# Patient Record
Sex: FEMALE | Race: WHITE | NOT HISPANIC OR LATINO | Employment: UNEMPLOYED | URBAN - METROPOLITAN AREA
[De-identification: names, ages, dates, MRNs, and addresses within clinical notes are randomized per-mention and may not be internally consistent; named-entity substitution may affect disease eponyms.]

---

## 2017-01-19 ENCOUNTER — TELEPHONE (OUTPATIENT)
Dept: PAIN MEDICINE | Facility: CLINIC | Age: 42
End: 2017-01-19

## 2017-01-19 NOTE — TELEPHONE ENCOUNTER
----- Message from Megan Costa sent at 1/19/2017 12:12 PM CST -----  Contact: [phone] mr tricia clarke would like to know if patient can be seen asap for anxiety

## 2017-01-20 ENCOUNTER — TELEPHONE (OUTPATIENT)
Dept: INTERNAL MEDICINE | Facility: CLINIC | Age: 42
End: 2017-01-20

## 2017-01-20 NOTE — TELEPHONE ENCOUNTER
----- Message from Megan Costa sent at 1/19/2017  4:24 PM CST -----  Contact: mr clarke [phone]   tricia would like to speak with you to schedule patient for an appt. For anxiety

## 2017-01-31 ENCOUNTER — OFFICE VISIT (OUTPATIENT)
Dept: INTERNAL MEDICINE | Facility: CLINIC | Age: 42
End: 2017-01-31
Payer: COMMERCIAL

## 2017-01-31 VITALS
BODY MASS INDEX: 27.72 KG/M2 | WEIGHT: 146.81 LBS | DIASTOLIC BLOOD PRESSURE: 74 MMHG | SYSTOLIC BLOOD PRESSURE: 112 MMHG | HEIGHT: 61 IN | HEART RATE: 84 BPM

## 2017-01-31 DIAGNOSIS — Z00.00 PREVENTATIVE HEALTH CARE: ICD-10-CM

## 2017-01-31 DIAGNOSIS — F32.A DEPRESSION, UNSPECIFIED DEPRESSION TYPE: ICD-10-CM

## 2017-01-31 DIAGNOSIS — R53.83 FATIGUE, UNSPECIFIED TYPE: Primary | ICD-10-CM

## 2017-01-31 DIAGNOSIS — F41.9 ANXIETY: ICD-10-CM

## 2017-01-31 DIAGNOSIS — Z23 NEEDS FLU SHOT: ICD-10-CM

## 2017-01-31 DIAGNOSIS — E66.3 OVERWEIGHT (BMI 25.0-29.9): ICD-10-CM

## 2017-01-31 PROCEDURE — 90688 IIV4 VACCINE SPLT 0.5 ML IM: CPT | Mod: S$GLB,,, | Performed by: INTERNAL MEDICINE

## 2017-01-31 PROCEDURE — 1159F MED LIST DOCD IN RCRD: CPT | Mod: S$GLB,,, | Performed by: INTERNAL MEDICINE

## 2017-01-31 PROCEDURE — 90471 IMMUNIZATION ADMIN: CPT | Mod: S$GLB,,, | Performed by: INTERNAL MEDICINE

## 2017-01-31 PROCEDURE — 99214 OFFICE O/P EST MOD 30 MIN: CPT | Mod: 25,S$GLB,, | Performed by: INTERNAL MEDICINE

## 2017-01-31 PROCEDURE — 99999 PR PBB SHADOW E&M-EST. PATIENT-LVL III: CPT | Mod: PBBFAC,,, | Performed by: INTERNAL MEDICINE

## 2017-01-31 RX ORDER — VENLAFAXINE HYDROCHLORIDE 37.5 MG/1
37.5 CAPSULE, EXTENDED RELEASE ORAL DAILY
Qty: 30 CAPSULE | Refills: 1 | Status: SHIPPED | OUTPATIENT
Start: 2017-01-31 | End: 2017-03-27 | Stop reason: SDUPTHER

## 2017-01-31 NOTE — PROGRESS NOTES
Subjective:       Patient ID: Maureen Gordillo is a 41 y.o. female.    Chief Complaint: Anxiety    HPI Pt. Here for f/u for anxiety/depression and intermitent fatigue; she states lexapro has not been helping and she would like alternative; she denies suicidal ideation; she will come back fasting for labs; she would like flu shot; she also reports intermitent fatigue and states she is always running around with 4 children; pt.  is with the pt. ; she has lost a few pounds  Review of Systems   Constitutional: Positive for fatigue. Negative for chills and fever.   HENT: Negative for congestion, rhinorrhea and sore throat.    Respiratory: Negative for cough, shortness of breath and wheezing.    Cardiovascular: Negative for chest pain.   Gastrointestinal: Negative for abdominal pain, nausea and vomiting.   Genitourinary: Negative for dysuria.   Musculoskeletal: Negative for arthralgias.   Skin: Negative for rash.   Neurological: Negative for dizziness and headaches.   Psychiatric/Behavioral: Negative for sleep disturbance. The patient is not nervous/anxious.         Anxiety and depression without suicidal ideation; lexapro is not helping       Objective:      Physical Exam   Constitutional: She is oriented to person, place, and time.   Overweight    Eyes: EOM are normal.   Neck: Normal range of motion.   Cardiovascular: Normal rate, regular rhythm and normal heart sounds.    Pulmonary/Chest: Effort normal and breath sounds normal.   Abdominal: Soft. There is no tenderness. There is no rebound and no guarding.   Musculoskeletal: Normal range of motion.   Neurological: She is alert and oriented to person, place, and time.   Skin: No rash noted.       Assessment:       1. Fatigue, unspecified type Active   2. Anxiety Sub-optimally controlled   3. Depression, unspecified depression type Sub-optimally controlled   4. Preventative health care Active   5. Needs flu shot Active       Plan:         Fatigue, unspecified  type  Comments:  get labs including TSH; monitor with better control of depression; pt. has 4 children and she states life can be hectic with family as well  Orders:  -     CBC auto differential; Future; Expected date: 1/31/17  -     TSH; Future; Expected date: 1/31/17    Anxiety  Comments:  D/C lexapro and start effexor XR 37.5 mg QD   Orders:  -     venlafaxine (EFFEXOR-XR) 37.5 MG 24 hr capsule; Take 1 capsule (37.5 mg total) by mouth once daily.  Dispense: 30 capsule; Refill: 1    Depression, unspecified depression type  Comments:  D/C lexapro and start effexor; re-evaluate in 1 month  Orders:  -     venlafaxine (EFFEXOR-XR) 37.5 MG 24 hr capsule; Take 1 capsule (37.5 mg total) by mouth once daily.  Dispense: 30 capsule; Refill: 1    Preventative health care  -     CBC auto differential; Future; Expected date: 1/31/17  -     Comprehensive metabolic panel; Future; Expected date: 1/31/17  -     Lipid panel; Future; Expected date: 1/31/17  -     TSH; Future; Expected date: 1/31/17    Needs flu shot  -     Influenza - Quadrivalent (3 years & older)

## 2017-01-31 NOTE — MR AVS SNAPSHOT
[facility]   [place]  [place] [address]  Phone: [phone]  Fax: [phone]                  Maureen Gordillo   2017 1:40 PM   Office Visit    Description:  Female : 1975   Provider:  Elpidio Vasques MD   Department:  [facility]           Reason for Visit     Anxiety           Diagnoses this Visit        Comments    Fatigue, unspecified type    -  Primary get labs including TSH; monitor with better control of depression; pt. has 4 children and she states life can be hectic with family as well    Anxiety     D/C lexapro and start effexor XR 37.5 mg QD     Depression, unspecified depression type     D/C lexapro and start effexor; re-evaluate in 1 month    Preventative health care         Needs flu shot                To Do List           Future Appointments        Provider Department Dept Phone    2017 7:00 AM SIMA, [facility] [phone]    3/7/2017 8:20 AM Elpidio Vasques MD [facility] [phone]      Goals (5 Years of Data)     None      Follow-Up and Disposition     Return in about 1 month (around 2017).       These Medications        Disp Refills Start End    venlafaxine (EFFEXOR-XR) 37.5 MG 24 hr capsule 30 capsule 1 2017    Take 1 capsule (37.5 mg total) by mouth once daily. - Oral    Pharmacy: [facility]/pharmacy #[id number] - [place] - [id number] [facility] Ph #: [phone]         [facility]     [facility] -  Assistance  Registered nurses in the [facility] provide clinical advisement, health education, appointment booking, and other advisory services.  Call for this free service at [phone].             Medications           Message regarding Medications     Verify the changes and/or additions to your medication regime listed below are the same as discussed with your clinician today.  If any of these changes or additions are  "incorrect, please notify your healthcare provider.        START taking these NEW medications        Refills    venlafaxine (EFFEXOR-XR) 37.5 MG 24 hr capsule 1    Sig: Take 1 capsule (37.5 mg total) by mouth once daily.    Class: Normal    Route: Oral      STOP taking these medications     doxycycline (ORACEA) 40 mg capsule Take 1 capsule (40 mg total) by mouth once daily.    escitalopram oxalate (LEXAPRO) 20 MG tablet TAKE 1 TABLET (20 MG TOTAL) BY MOUTH ONCE DAILY.           Verify that the below list of medications is an accurate representation of the medications you are currently taking.  If none reported, the list may be blank. If incorrect, please contact your healthcare provider. Carry this list with you in case of emergency.           Current Medications     ivermectin 1 % Crea Apply topically once daily.    venlafaxine (EFFEXOR-XR) 37.5 MG 24 hr capsule Take 1 capsule (37.5 mg total) by mouth once daily.           Clinical Reference Information           Vital Signs - Last Recorded  Most recent update: 1/31/2017  2:00 PM by Milana Yanez MA    BP Pulse Ht Wt BMI    112/74 (BP Location: Right arm, Patient Position: Sitting, BP Method: Manual) 84 5' 1" (1.549 m) 66.6 kg (146 lb 13.2 oz) 27.74 kg/m2      Blood Pressure          Most Recent Value    BP  112/74      Allergies as of 1/31/2017     No Known Allergies      Immunizations Administered on Date of Encounter - 1/31/2017     Name Date Dose VIS Date Route    influenza - Quadrivalent  Incomplete 0.5 mL 8/7/2015 Intramuscular      Orders Placed During Today's Visit      Normal Orders This Visit    Influenza - Quadrivalent (3 years & older)     Future Labs/Procedures Expected by Expires    CBC auto differential  1/31/2017 9/30/2017    Comprehensive metabolic panel  1/31/2017 9/30/2017    Lipid panel  1/31/2017 9/30/2017    TSH  1/31/2017 9/30/2017      "

## 2017-02-02 ENCOUNTER — LAB VISIT (OUTPATIENT)
Dept: LAB | Facility: HOSPITAL | Age: 42
End: 2017-02-02
Attending: INTERNAL MEDICINE
Payer: COMMERCIAL

## 2017-02-02 DIAGNOSIS — R53.83 FATIGUE, UNSPECIFIED TYPE: ICD-10-CM

## 2017-02-02 DIAGNOSIS — Z00.00 PREVENTATIVE HEALTH CARE: ICD-10-CM

## 2017-02-02 LAB
ALBUMIN SERPL BCP-MCNC: 4 G/DL
ALP SERPL-CCNC: 68 U/L
ALT SERPL W/O P-5'-P-CCNC: 11 U/L
ANION GAP SERPL CALC-SCNC: 7 MMOL/L
AST SERPL-CCNC: 16 U/L
BASOPHILS # BLD AUTO: 0 K/UL
BASOPHILS NFR BLD: 0 %
BILIRUB SERPL-MCNC: 0.7 MG/DL
BUN SERPL-MCNC: 15 MG/DL
CALCIUM SERPL-MCNC: 9.1 MG/DL
CHLORIDE SERPL-SCNC: 104 MMOL/L
CHOLEST/HDLC SERPL: 3.1 {RATIO}
CO2 SERPL-SCNC: 26 MMOL/L
CREAT SERPL-MCNC: 0.8 MG/DL
DIFFERENTIAL METHOD: ABNORMAL
EOSINOPHIL # BLD AUTO: 0 K/UL
EOSINOPHIL NFR BLD: 1.5 %
ERYTHROCYTE [DISTWIDTH] IN BLOOD BY AUTOMATED COUNT: 12 %
EST. GFR  (AFRICAN AMERICAN): >60 ML/MIN/1.73 M^2
EST. GFR  (NON AFRICAN AMERICAN): >60 ML/MIN/1.73 M^2
GLUCOSE SERPL-MCNC: 80 MG/DL
HCT VFR BLD AUTO: 39.5 %
HDL/CHOLESTEROL RATIO: 32.4 %
HDLC SERPL-MCNC: 207 MG/DL
HDLC SERPL-MCNC: 67 MG/DL
HGB BLD-MCNC: 13.7 G/DL
LDLC SERPL CALC-MCNC: 113.6 MG/DL
LYMPHOCYTES # BLD AUTO: 0.9 K/UL
LYMPHOCYTES NFR BLD: 32.1 %
MCH RBC QN AUTO: 31.4 PG
MCHC RBC AUTO-ENTMCNC: 34.7 %
MCV RBC AUTO: 91 FL
MONOCYTES # BLD AUTO: 0.4 K/UL
MONOCYTES NFR BLD: 15.1 %
NEUTROPHILS # BLD AUTO: 1.4 K/UL
NEUTROPHILS NFR BLD: 50.9 %
NONHDLC SERPL-MCNC: 140 MG/DL
PLATELET # BLD AUTO: 142 K/UL
PMV BLD AUTO: 11.6 FL
POTASSIUM SERPL-SCNC: 4.2 MMOL/L
PROT SERPL-MCNC: 7.4 G/DL
RBC # BLD AUTO: 4.36 M/UL
SODIUM SERPL-SCNC: 137 MMOL/L
TRIGL SERPL-MCNC: 132 MG/DL
TSH SERPL DL<=0.005 MIU/L-ACNC: 2.65 UIU/ML
WBC # BLD AUTO: 2.71 K/UL

## 2017-02-02 PROCEDURE — 84443 ASSAY THYROID STIM HORMONE: CPT

## 2017-02-02 PROCEDURE — 80053 COMPREHEN METABOLIC PANEL: CPT

## 2017-02-02 PROCEDURE — 36415 COLL VENOUS BLD VENIPUNCTURE: CPT | Mod: PO

## 2017-02-02 PROCEDURE — 80061 LIPID PANEL: CPT

## 2017-02-02 PROCEDURE — 85025 COMPLETE CBC W/AUTO DIFF WBC: CPT

## 2017-03-27 ENCOUNTER — LAB VISIT (OUTPATIENT)
Dept: LAB | Facility: HOSPITAL | Age: 42
End: 2017-03-27
Attending: INTERNAL MEDICINE
Payer: COMMERCIAL

## 2017-03-27 ENCOUNTER — OFFICE VISIT (OUTPATIENT)
Dept: INTERNAL MEDICINE | Facility: CLINIC | Age: 42
End: 2017-03-27
Payer: COMMERCIAL

## 2017-03-27 VITALS
WEIGHT: 149.69 LBS | HEIGHT: 61 IN | HEART RATE: 81 BPM | SYSTOLIC BLOOD PRESSURE: 122 MMHG | BODY MASS INDEX: 28.26 KG/M2 | DIASTOLIC BLOOD PRESSURE: 82 MMHG

## 2017-03-27 DIAGNOSIS — F41.9 ANXIETY: ICD-10-CM

## 2017-03-27 DIAGNOSIS — D72.819 LEUKOPENIA, UNSPECIFIED TYPE: ICD-10-CM

## 2017-03-27 DIAGNOSIS — F32.A DEPRESSION, UNSPECIFIED DEPRESSION TYPE: ICD-10-CM

## 2017-03-27 DIAGNOSIS — E78.00 HYPERCHOLESTEREMIA: Primary | ICD-10-CM

## 2017-03-27 DIAGNOSIS — E66.3 OVERWEIGHT (BMI 25.0-29.9): ICD-10-CM

## 2017-03-27 DIAGNOSIS — D69.6 THROMBOCYTOPENIA: ICD-10-CM

## 2017-03-27 LAB
BASOPHILS # BLD AUTO: 0.01 K/UL
BASOPHILS NFR BLD: 0.3 %
DIFFERENTIAL METHOD: ABNORMAL
EOSINOPHIL # BLD AUTO: 0 K/UL
EOSINOPHIL NFR BLD: 1.1 %
ERYTHROCYTE [DISTWIDTH] IN BLOOD BY AUTOMATED COUNT: 12.5 %
HCT VFR BLD AUTO: 39.1 %
HGB BLD-MCNC: 13.7 G/DL
LYMPHOCYTES # BLD AUTO: 1 K/UL
LYMPHOCYTES NFR BLD: 28.9 %
MCH RBC QN AUTO: 31.4 PG
MCHC RBC AUTO-ENTMCNC: 35 %
MCV RBC AUTO: 90 FL
MONOCYTES # BLD AUTO: 0.3 K/UL
MONOCYTES NFR BLD: 9.2 %
NEUTROPHILS # BLD AUTO: 2.1 K/UL
NEUTROPHILS NFR BLD: 60.5 %
PLATELET # BLD AUTO: 166 K/UL
PMV BLD AUTO: 11.5 FL
RBC # BLD AUTO: 4.37 M/UL
WBC # BLD AUTO: 3.49 K/UL

## 2017-03-27 PROCEDURE — 99214 OFFICE O/P EST MOD 30 MIN: CPT | Mod: S$GLB,,, | Performed by: INTERNAL MEDICINE

## 2017-03-27 PROCEDURE — 86703 HIV-1/HIV-2 1 RESULT ANTBDY: CPT

## 2017-03-27 PROCEDURE — 1160F RVW MEDS BY RX/DR IN RCRD: CPT | Mod: S$GLB,,, | Performed by: INTERNAL MEDICINE

## 2017-03-27 PROCEDURE — 99999 PR PBB SHADOW E&M-EST. PATIENT-LVL III: CPT | Mod: PBBFAC,,, | Performed by: INTERNAL MEDICINE

## 2017-03-27 PROCEDURE — 36415 COLL VENOUS BLD VENIPUNCTURE: CPT | Mod: PO

## 2017-03-27 PROCEDURE — 85025 COMPLETE CBC W/AUTO DIFF WBC: CPT

## 2017-03-27 RX ORDER — VENLAFAXINE HYDROCHLORIDE 75 MG/1
75 CAPSULE, EXTENDED RELEASE ORAL DAILY
Qty: 30 CAPSULE | Refills: 1 | Status: SHIPPED | OUTPATIENT
Start: 2017-03-27 | End: 2017-03-29 | Stop reason: SDUPTHER

## 2017-03-27 NOTE — PROGRESS NOTES
Subjective:       Patient ID: Maureen Gordillo is a 41 y.o. female.    Chief Complaint: Anxiety (Increase in Effexor)    HPI Pt. Here for f/u for anxiety/depression; she states effexor helps but she would like to increase dose;  I reviewed labs dated 2/2/17; cholesterol was mildly elevated; platelets were mildly low; white count was low but stable; she denies risk factors for HIV but is agreeable to screening; she has gained weight  Review of Systems   Constitutional: Negative for chills, fatigue and fever.   HENT: Negative for congestion, rhinorrhea and sore throat.    Respiratory: Negative for cough, shortness of breath and wheezing.    Cardiovascular: Negative for chest pain.   Gastrointestinal: Negative for abdominal pain, nausea and vomiting.   Genitourinary: Negative for dysuria.   Musculoskeletal: Negative for arthralgias.   Skin: Negative for rash.   Neurological: Negative for dizziness and headaches.   Psychiatric/Behavioral: Negative for sleep disturbance. The patient is nervous/anxious.         Anxiety and depression improving but not completely controlled       Objective:      Physical Exam   Constitutional: She is oriented to person, place, and time.   overweight   Eyes: EOM are normal.   Neck: Normal range of motion.   Cardiovascular: Normal rate, regular rhythm and normal heart sounds.    Pulmonary/Chest: Effort normal and breath sounds normal.   Abdominal: Soft. There is no tenderness. There is no rebound and no guarding.   Musculoskeletal: Normal range of motion.   Neurological: She is alert and oriented to person, place, and time.   Skin: No rash noted.       Assessment:       1. Hypercholesteremia Sub-optimally controlled   2. Thrombocytopenia Active   3. Leukopenia, unspecified type Stable   4. Anxiety Sub-optimally controlled   5. Depression, unspecified depression type Sub-optimally controlled   6. Overweight (BMI 25.0-29.9) Sub-optimally controlled       Plan:          Hypercholesteremia  Comments:  encouraged low cholesterol diet     Thrombocytopenia  Comments:  repeat CBC in 1 month for verification  Orders:  -     CBC auto differential; Future; Expected date: 3/27/17    Leukopenia, unspecified type  Comments:  get HIV screening  Orders:  -     HIV-1 and HIV-2 antibodies; Future; Expected date: 3/27/17    Anxiety  Comments:  increase dose of effexor and re-evaluate in 1 month   Orders:  -     venlafaxine (EFFEXOR-XR) 75 MG 24 hr capsule; Take 1 capsule (75 mg total) by mouth once daily.  Dispense: 30 capsule; Refill: 1    Depression, unspecified depression type  Comments:  increase dose of effexor and re-evaluate in 1 month   Orders:  -     venlafaxine (EFFEXOR-XR) 75 MG 24 hr capsule; Take 1 capsule (75 mg total) by mouth once daily.  Dispense: 30 capsule; Refill: 1    Overweight (BMI 25.0-29.9)  Comments:  encouraged diet

## 2017-03-27 NOTE — MR AVS SNAPSHOT
[facility]   [place]  [place] [address]  Phone: [phone]  Fax: [phone]                  Maureen Gordillo   3/27/2017 9:20 AM   Office Visit    Description:  Female : 1975   Provider:  Elpidio Vasques MD   Department:  [facility]           Reason for Visit     Anxiety           Diagnoses this Visit        Comments    Hypercholesteremia    -  Primary     Thrombocytopenia         Leukopenia, unspecified type         Anxiety         Depression, unspecified depression type         Overweight (BMI 25.0-29.9)                To Do List           Future Appointments        Provider Department Dept Phone    3/27/2017 10:15 AM LAB, [facility] [phone]    2017 1:00 PM Elpidio Vasques MD [facility] [phone]      Goals (5 Years of Data)     None       These Medications        Disp Refills Start End    venlafaxine (EFFEXOR-XR) 75 MG 24 hr capsule 30 capsule 1 3/27/2017 3/27/2018    Take 1 capsule (75 mg total) by mouth once daily. - Oral    Pharmacy: [facility]/pharmacy #[id number] - [place] - [id number] [facility] #: [phone]         [facility]     [facility] Care [facility] -  Assistance  Registered nurses in the [facility] provide clinical advisement, health education, appointment booking, and other advisory services.  Call for this free service at [phone].             Medications           Message regarding Medications     Verify the changes and/or additions to your medication regime listed below are the same as discussed with your clinician today.  If any of these changes or additions are incorrect, please notify your healthcare provider.        CHANGE how you are taking these medications     Start Taking Instead of    venlafaxine (EFFEXOR-XR) 75 MG 24 hr capsule venlafaxine (EFFEXOR-XR) 37.5 MG 24 hr capsule    Dosage:  Take 1 capsule (75 mg total) by mouth once daily.  "Dosage:  Take 1 capsule (37.5 mg total) by mouth once daily.    Reason for Change:  Reorder            Verify that the below list of medications is an accurate representation of the medications you are currently taking.  If none reported, the list may be blank. If incorrect, please contact your healthcare provider. Carry this list with you in case of emergency.           Current Medications     ivermectin 1 % Crea Apply topically once daily.    venlafaxine (EFFEXOR-XR) 75 MG 24 hr capsule Take 1 capsule (75 mg total) by mouth once daily.           Clinical Reference Information           Your Vitals Were     BP Pulse Height Weight BMI    122/82 (BP Location: Left arm, Patient Position: Sitting, BP Method: Manual) 81 5' 1" (1.549 m) 67.9 kg (149 lb 11.1 oz) 28.28 kg/m2      Blood Pressure          Most Recent Value    BP  122/82      Allergies as of 3/27/2017     No Known Allergies      Immunizations Administered on Date of Encounter - 3/27/2017     None      Orders Placed During Today's Visit     Future Labs/Procedures Expected by Expires    CBC auto differential  3/27/2017 11/27/2017    HIV-1 and HIV-2 antibodies  3/27/2017 11/27/2017      Language Assistance Services     ATTENTION: Language assistance services are available, free of charge. Please call [phone].      ATENCIÓN: Si tamie rust, tiene a quintana disposición servicios gratuitos de asistencia lingüística. Llame al [phone].     CHÚ Ý: N?u b?n nói Ti?ng Vi?t, có các d?ch v? h? tr? ngôn ng? mi?n phí dành cho b?n. G?i s? [phone].         [facility] complies with applicable Federal civil rights laws and does not discriminate on the basis of race, color, national origin, age, disability, or sex.        "

## 2017-03-28 LAB — HIV 1+2 AB+HIV1 P24 AG SERPL QL IA: NEGATIVE

## 2017-03-29 ENCOUNTER — PATIENT MESSAGE (OUTPATIENT)
Dept: INTERNAL MEDICINE | Facility: CLINIC | Age: 42
End: 2017-03-29

## 2017-03-29 DIAGNOSIS — F32.A DEPRESSION, UNSPECIFIED DEPRESSION TYPE: ICD-10-CM

## 2017-03-29 DIAGNOSIS — F41.9 ANXIETY: ICD-10-CM

## 2017-03-29 RX ORDER — VENLAFAXINE HYDROCHLORIDE 75 MG/1
75 CAPSULE, EXTENDED RELEASE ORAL DAILY
Qty: 30 CAPSULE | Refills: 1 | Status: SHIPPED | OUTPATIENT
Start: 2017-03-29 | End: 2017-05-08 | Stop reason: SDUPTHER

## 2017-05-08 ENCOUNTER — OFFICE VISIT (OUTPATIENT)
Dept: INTERNAL MEDICINE | Facility: CLINIC | Age: 42
End: 2017-05-08
Payer: COMMERCIAL

## 2017-05-08 VITALS
BODY MASS INDEX: 29.02 KG/M2 | WEIGHT: 153.69 LBS | DIASTOLIC BLOOD PRESSURE: 76 MMHG | HEIGHT: 61 IN | HEART RATE: 85 BPM | SYSTOLIC BLOOD PRESSURE: 108 MMHG | OXYGEN SATURATION: 98 %

## 2017-05-08 DIAGNOSIS — F32.A DEPRESSION, UNSPECIFIED DEPRESSION TYPE: ICD-10-CM

## 2017-05-08 DIAGNOSIS — Z00.00 PREVENTATIVE HEALTH CARE: ICD-10-CM

## 2017-05-08 DIAGNOSIS — E66.3 OVERWEIGHT (BMI 25.0-29.9): ICD-10-CM

## 2017-05-08 DIAGNOSIS — F41.9 ANXIETY: Primary | ICD-10-CM

## 2017-05-08 DIAGNOSIS — D72.819 LEUKOPENIA, UNSPECIFIED TYPE: ICD-10-CM

## 2017-05-08 PROCEDURE — 99999 PR PBB SHADOW E&M-EST. PATIENT-LVL III: CPT | Mod: PBBFAC,,, | Performed by: INTERNAL MEDICINE

## 2017-05-08 PROCEDURE — 99395 PREV VISIT EST AGE 18-39: CPT | Mod: S$GLB,,, | Performed by: INTERNAL MEDICINE

## 2017-05-08 RX ORDER — VENLAFAXINE HYDROCHLORIDE 75 MG/1
75 CAPSULE, EXTENDED RELEASE ORAL DAILY
Qty: 90 CAPSULE | Refills: 1 | Status: SHIPPED | OUTPATIENT
Start: 2017-05-08 | End: 2017-08-23 | Stop reason: ALTCHOICE

## 2017-05-08 NOTE — MR AVS SNAPSHOT
[facility]   [place]  [place] [address]  Phone: [phone]  Fax: [phone]                  Maureen Gordillo   2017 1:00 PM   Office Visit    Description:  Female : 1975   Provider:  Elpidio Vasques MD   Department:  [facility]           Reason for Visit     Follow-up     Anxiety           Diagnoses this Visit        Comments    Anxiety    -  Primary continue effexor    Depression, unspecified depression type     continue effexor    Preventative health care         Leukopenia, unspecified type     improving; HIV negative; monitor    Overweight (BMI 25.0-29.9)     encouraged diet            To Do List           Goals (5 Years of Data)     None      Follow-Up and Disposition     Return in about 1 year (around 2018).    Follow-up and Disposition History       These Medications        Disp Refills Start End    venlafaxine (EFFEXOR-XR) 75 MG 24 hr capsule 90 capsule 1 2017     Take 1 capsule (75 mg total) by mouth once daily. - Oral    Pharmacy: [facility]/pharmacy #[id number] - [place] - [id number] [facility] Ph #: [phone]         [facility]     [facility] Care [facility] -  Assistance  Unless otherwise directed by your provider, please contact [facility], our nurse care line that is available for  assistance.     Registered nurses in the [facility] provide: appointment scheduling, clinical advisement, health education, and other advisory services.  Call: [phone] (toll free)               Medications           Message regarding Medications     Verify the changes and/or additions to your medication regime listed below are the same as discussed with your clinician today.  If any of these changes or additions are incorrect, please notify your healthcare provider.             Verify that the below list of medications is an accurate representation of the medications you are currently taking.  If none reported,  "the list may be blank. If incorrect, please contact your healthcare provider. Carry this list with you in case of emergency.           Current Medications     ivermectin 1 % Crea Apply topically once daily.    venlafaxine (EFFEXOR-XR) 75 MG 24 hr capsule Take 1 capsule (75 mg total) by mouth once daily.           Clinical Reference Information           Your Vitals Were     BP Pulse Height Weight Last Period SpO2    108/76 (BP Location: Right arm, Patient Position: Sitting, BP Method: Manual) 85 5' 1" (1.549 m) 69.7 kg (153 lb 10.6 oz) 10/17/2015 98%    BMI                29.03 kg/m2          Blood Pressure          Most Recent Value    BP  108/76      Allergies as of 5/8/2017     No Known Allergies      Immunizations Administered on Date of Encounter - 5/8/2017     None      Orders Placed During Today's Visit     Future Labs/Procedures Expected by Expires    CBC auto differential  1/8/2018 5/8/2018    Comprehensive metabolic panel  1/8/2018 5/8/2018    Lipid panel  1/8/2018 5/8/2018    TSH  1/8/2018 5/8/2018      Language Assistance Services     ATTENTION: Language assistance services are available, free of charge. Please call [phone].      ATENCIÓN: Si tamie rsut, tiene a quintana disposición servicios gratuitos de asistencia lingüística. Llame al [phone].     CHÚ Ý: N?u b?n nói Ti?ng Vi?t, có các d?ch v? h? tr? ngôn ng? mi?n phí dành cho b?n. G?i s? [phone].         [facility] complies with applicable Federal civil rights laws and does not discriminate on the basis of race, color, national origin, age, disability, or sex.        "

## 2017-05-08 NOTE — PROGRESS NOTES
Subjective:       Patient ID: Maureen Gordillo is a 41 y.o. female.    Chief Complaint: Follow-up (1 mo) and Anxiety (review med)    HPI Pt. Here for f/u for anxiety and depression; she states higher dose of effexor helps; I reviewed labs dated 3/27/17; white count was improving; HIV test was negative; she has gained a few pounds  Review of Systems   Constitutional: Negative for chills, fatigue and fever.   HENT: Negative for congestion, rhinorrhea and sore throat.    Respiratory: Negative for cough, shortness of breath and wheezing.    Cardiovascular: Negative for chest pain.   Gastrointestinal: Negative for abdominal pain, nausea and vomiting.   Genitourinary: Negative for dysuria.   Musculoskeletal: Negative for arthralgias.   Skin: Negative for rash.   Neurological: Negative for dizziness and headaches.   Psychiatric/Behavioral: Negative for sleep disturbance. The patient is nervous/anxious.         Anxiety and depression well controlled with higher dose of effexor       Objective:      Physical Exam   Constitutional: She is oriented to person, place, and time. She appears well-developed.   Eyes: EOM are normal.   Neck: Normal range of motion.   Cardiovascular: Normal rate, regular rhythm and normal heart sounds.    Pulmonary/Chest: Effort normal and breath sounds normal.   Abdominal: Soft. There is no tenderness. There is no rebound and no guarding.   Musculoskeletal: Normal range of motion.   Neurological: She is alert and oriented to person, place, and time.   Skin: No rash noted.       Assessment:       1. Anxiety Well controlled   2. Depression, unspecified depression type Well controlled   3. Preventative health care Active   4. Leukopenia, unspecified type Active   5. Overweight (BMI 25.0-29.9) Sub-optimally controlled       Plan:         Anxiety  Comments:  continue effexor  Orders:  -     venlafaxine (EFFEXOR-XR) 75 MG 24 hr capsule; Take 1 capsule (75 mg total) by mouth once daily.  Dispense: 90  capsule; Refill: 1    Depression, unspecified depression type  Comments:  continue effexor  Orders:  -     venlafaxine (EFFEXOR-XR) 75 MG 24 hr capsule; Take 1 capsule (75 mg total) by mouth once daily.  Dispense: 90 capsule; Refill: 1    [facility] care  -     Comprehensive metabolic panel; Future; Expected date: 1/8/18  -     Lipid panel; Future; Expected date: 1/8/18  -     TSH; Future; Expected date: 1/8/18  -     CBC auto differential; Future; Expected date: 1/8/18    Leukopenia, unspecified type  Comments:  improving; HIV negative; monitor    Overweight (BMI 25.0-29.9)  Comments:  encouraged diet

## 2017-08-07 PROBLEM — Z00.00 PREVENTATIVE HEALTH CARE: Status: RESOLVED | Noted: 2017-05-08 | Resolved: 2017-08-07

## 2017-08-18 ENCOUNTER — TELEPHONE (OUTPATIENT)
Dept: INTERNAL MEDICINE | Facility: CLINIC | Age: 42
End: 2017-08-18

## 2017-08-18 ENCOUNTER — PATIENT MESSAGE (OUTPATIENT)
Dept: INTERNAL MEDICINE | Facility: CLINIC | Age: 42
End: 2017-08-18

## 2017-08-18 NOTE — TELEPHONE ENCOUNTER
Notify pt. I reviewed her email; I can try wellbutrin; see if she has ever had any hx of seizures and let me know so I can try this med

## 2017-08-22 ENCOUNTER — TELEPHONE (OUTPATIENT)
Dept: INTERNAL MEDICINE | Facility: CLINIC | Age: 42
End: 2017-08-22

## 2017-08-22 DIAGNOSIS — F41.9 ANXIETY: ICD-10-CM

## 2017-08-22 DIAGNOSIS — F32.A DEPRESSION, UNSPECIFIED DEPRESSION TYPE: Primary | ICD-10-CM

## 2017-08-22 NOTE — TELEPHONE ENCOUNTER
Spoke to pt and adv Dr. Vasques wants to try med Wellbutrin. Also asked if she has any hx of seizures, pt adv no history of seizures. Pt adv will be called back once script is sent to pharmacy. Pt verbalizes understanding.

## 2017-08-23 RX ORDER — BUPROPION HYDROCHLORIDE 150 MG/1
150 TABLET, EXTENDED RELEASE ORAL DAILY
Qty: 30 TABLET | Refills: 1 | Status: SHIPPED | OUTPATIENT
Start: 2017-08-23 | End: 2017-10-16 | Stop reason: CLARIF

## 2017-10-16 ENCOUNTER — OFFICE VISIT (OUTPATIENT)
Dept: INTERNAL MEDICINE | Facility: CLINIC | Age: 42
End: 2017-10-16
Payer: COMMERCIAL

## 2017-10-16 VITALS
DIASTOLIC BLOOD PRESSURE: 80 MMHG | RESPIRATION RATE: 16 BRPM | TEMPERATURE: 98 F | WEIGHT: 155.44 LBS | BODY MASS INDEX: 29.35 KG/M2 | SYSTOLIC BLOOD PRESSURE: 112 MMHG | HEART RATE: 80 BPM | HEIGHT: 61 IN | OXYGEN SATURATION: 98 %

## 2017-10-16 DIAGNOSIS — F40.10 SOCIAL PHOBIA, UNSPECIFIED: ICD-10-CM

## 2017-10-16 DIAGNOSIS — F32.A DEPRESSION, UNSPECIFIED DEPRESSION TYPE: Primary | ICD-10-CM

## 2017-10-16 PROCEDURE — 99214 OFFICE O/P EST MOD 30 MIN: CPT | Mod: S$GLB,,, | Performed by: INTERNAL MEDICINE

## 2017-10-16 RX ORDER — SERTRALINE HYDROCHLORIDE 100 MG/1
100 TABLET, FILM COATED ORAL DAILY
Qty: 30 TABLET | Refills: 11 | Status: SHIPPED | OUTPATIENT
Start: 2017-10-16 | End: 2018-12-31 | Stop reason: ALTCHOICE

## 2017-10-16 NOTE — PROGRESS NOTES
"Subjective:      Patient ID: Maureen Gordillo is a 42 y.o. female.    Chief Complaint: Establish Care; Anxiety; and Depression    HPI: 42 y.o. White female , pt of Dr. Elpidio Vasques.  She did not go on the Wellbutrin because she read the side effects and got frightened.  She is under extreme stress with a step child that is autistic , diabetic, special needs,  And 3 other children of hers. Her  is [place] days [place], several weeks home.  Her parents are available for some help, but the grandparents and mother of autistic  Child are not.  She panics with social issues, has a phobia of public places and dealing with people.  Very depressed. Not suicidal.  Not homicidal.  Does not feel comfortable " in her own skin".       Review of Systems   Constitutional: Positive for activity change. Negative for unexpected weight change.   HENT: Negative for hearing loss, rhinorrhea and trouble swallowing.    Eyes: Negative for discharge and visual disturbance.   Respiratory: Negative for chest tightness and wheezing.    Cardiovascular: Negative for chest pain and palpitations.   Gastrointestinal: Negative for blood in stool, constipation, diarrhea and vomiting.   Endocrine: Negative for polydipsia and polyuria.   Genitourinary: Negative for difficulty urinating, dysuria, hematuria and menstrual problem.   Musculoskeletal: Negative for arthralgias, joint swelling and neck pain.   Neurological: Positive for headaches. Negative for weakness.   Psychiatric/Behavioral: Positive for dysphoric mood. Negative for confusion.       Objective:   /80 (BP Location: Left arm, Patient Position: Sitting, BP Method: Large (Manual))   Pulse 80   Temp 98.2 °F (36.8 °C) (Oral)   Resp 16   Ht 5' 1" (1.549 m)   Wt 70.5 kg (155 lb 6.8 oz)   LMP 10/17/2015   SpO2 98%   BMI 29.37 kg/m²     Physical Exam   Constitutional: She is oriented to person, place, and time. She appears well-developed and well-nourished.   HENT:   Head: Normocephalic " and atraumatic.   Right Ear: External ear normal.   Left Ear: External ear normal.   Nose: Nose normal.   Mouth/Throat: Oropharynx is clear and moist.   Eyes: Conjunctivae and EOM are normal. Pupils are equal, round, and reactive to light.   Neck: Normal range of motion. Neck supple.   Cardiovascular: Normal rate, regular rhythm and normal heart sounds.    Pulmonary/Chest: Effort normal and breath sounds normal.   Abdominal: Soft. Bowel sounds are normal.   Musculoskeletal: Normal range of motion.   Neurological: She is alert and oriented to person, place, and time.   Skin: Skin is warm and dry.   Psychiatric: She has a normal mood and affect. Her behavior is normal.   Nursing note and vitals reviewed.      Assessment:     1. Depression, unspecified depression type    2. Social phobia, unspecified    Needs counseling also, with a psychotherapist.  Plan:     Depression, unspecified depression type  -     sertraline (ZOLOFT) 100 MG tablet; Take 1 tablet (100 mg total) by mouth once daily.  Dispense: 30 tablet; Refill: 11    Social phobia, unspecified  -     sertraline (ZOLOFT) 100 MG tablet; Take 1 tablet (100 mg total) by mouth once daily.  Dispense: 30 tablet; Refill: 11    Told to begin at 50mg/day for 1 week, then increase to 100mg/day.

## 2018-06-22 DIAGNOSIS — Z12.39 BREAST CANCER SCREENING: ICD-10-CM

## 2018-12-31 ENCOUNTER — OFFICE VISIT (OUTPATIENT)
Dept: INTERNAL MEDICINE | Facility: CLINIC | Age: 43
End: 2018-12-31
Payer: COMMERCIAL

## 2018-12-31 VITALS
OXYGEN SATURATION: 99 % | DIASTOLIC BLOOD PRESSURE: 70 MMHG | WEIGHT: 160.25 LBS | SYSTOLIC BLOOD PRESSURE: 122 MMHG | HEIGHT: 61 IN | BODY MASS INDEX: 30.26 KG/M2 | HEART RATE: 77 BPM

## 2018-12-31 DIAGNOSIS — Z90.710 HX OF HYSTERECTOMY: ICD-10-CM

## 2018-12-31 DIAGNOSIS — F32.A DEPRESSION, UNSPECIFIED DEPRESSION TYPE: ICD-10-CM

## 2018-12-31 DIAGNOSIS — Z23 NEED FOR TDAP VACCINATION: ICD-10-CM

## 2018-12-31 DIAGNOSIS — Z12.39 BREAST CANCER SCREENING: ICD-10-CM

## 2018-12-31 DIAGNOSIS — E66.9 CLASS 1 OBESITY WITHOUT SERIOUS COMORBIDITY WITH BODY MASS INDEX (BMI) OF 30.0 TO 30.9 IN ADULT, UNSPECIFIED OBESITY TYPE: ICD-10-CM

## 2018-12-31 DIAGNOSIS — Z00.00 PREVENTATIVE HEALTH CARE: Primary | ICD-10-CM

## 2018-12-31 DIAGNOSIS — Z00.00 ROUTINE GENERAL MEDICAL EXAMINATION AT A HEALTH CARE FACILITY: ICD-10-CM

## 2018-12-31 DIAGNOSIS — F41.9 ANXIETY: ICD-10-CM

## 2018-12-31 PROBLEM — E66.811 CLASS 1 OBESITY WITHOUT SERIOUS COMORBIDITY WITH BODY MASS INDEX (BMI) OF 30.0 TO 30.9 IN ADULT: Status: ACTIVE | Noted: 2018-12-31

## 2018-12-31 PROCEDURE — 90471 IMMUNIZATION ADMIN: CPT | Mod: S$GLB,,, | Performed by: INTERNAL MEDICINE

## 2018-12-31 PROCEDURE — 99396 PREV VISIT EST AGE 40-64: CPT | Mod: 25,S$GLB,, | Performed by: INTERNAL MEDICINE

## 2018-12-31 PROCEDURE — 99999 PR PBB SHADOW E&M-EST. PATIENT-LVL III: CPT | Mod: PBBFAC,,, | Performed by: INTERNAL MEDICINE

## 2018-12-31 PROCEDURE — 90715 TDAP VACCINE 7 YRS/> IM: CPT | Mod: S$GLB,,, | Performed by: INTERNAL MEDICINE

## 2018-12-31 RX ORDER — VENLAFAXINE HYDROCHLORIDE 37.5 MG/1
37.5 CAPSULE, EXTENDED RELEASE ORAL DAILY
Qty: 30 CAPSULE | Refills: 1 | Status: SHIPPED | OUTPATIENT
Start: 2018-12-31 | End: 2019-03-26 | Stop reason: SDUPTHER

## 2018-12-31 NOTE — PROGRESS NOTES
Answers for HPI/ROS submitted by the patient on 12/30/2018   activity change: No  unexpected weight change: No  rhinorrhea: No  trouble swallowing: No  visual disturbance: No  chest tightness: No  polyuria: No  difficulty urinating: No  menstrual problem: No  joint swelling: No  arthralgias: No  confusion: No  dysphoric mood: No  Pt. ID: Maureen Gordillo is a 43 y.o. female      Chief complaint:   Chief Complaint   Patient presents with    Annual Exam       HPI: pt. Here for well visit; she is no longer on zoloft due to resolution of symptoms; she states her depression and anxiety are bothersome again and would like to try something other than zoloft; she denies suicidal ideation; pt. Will come back fasting; she has gained a few pounds; LMP: hysterectomy; she would like tetanus shot; pt. Would like mammo      Review of Systems   HENT: Negative for hearing loss.    Eyes: Negative for discharge.   Respiratory: Negative for wheezing.    Cardiovascular: Negative for chest pain and palpitations.   Gastrointestinal: Negative for blood in stool, constipation, diarrhea and vomiting.   Genitourinary: Negative for dysuria and hematuria.   Musculoskeletal: Negative for neck pain.   Neurological: Negative for weakness and headaches.   Endo/Heme/Allergies: Negative for polydipsia.   Psychiatric/Behavioral: Positive for depression. Negative for suicidal ideas. The patient is nervous/anxious.          Objective:    Physical Exam   Constitutional: She is oriented to person, place, and time.   obese   Eyes: EOM are normal.   Neck: Normal range of motion.   Cardiovascular: Normal rate, regular rhythm and normal heart sounds.   Pulmonary/Chest: Effort normal and breath sounds normal.   Abdominal: Soft. There is no tenderness. There is no rebound and no guarding.   Musculoskeletal: Normal range of motion.   Neurological: She is alert and oriented to person, place, and time.   Skin: No rash noted.   Vitals reviewed.        Health  Maintenance   Topic Date Due    Mammogram  09/14/2017    TETANUS VACCINE  12/31/2028    Influenza Vaccine  Completed    Lipid Panel  Completed         Assessment:     1. Preventative health care Active   2. Depression, unspecified depression type Sub-optimally controlled   3. Anxiety Sub-optimally controlled   4. Routine general medical examination at a health care facility Active   5. Hx of hysterectomy Active   6. Class 1 obesity without serious comorbidity with body mass index (BMI) of 30.0 to 30.9 in adult, unspecified obesity type Sub-optimally controlled   7. Breast cancer screening Active   8. Need for Tdap vaccination Active         Plan: Preventative health care  -     CBC auto differential; Future; Expected date: 12/31/2018  -     Comprehensive metabolic panel; Future; Expected date: 12/31/2018  -     Lipid panel; Future; Expected date: 12/31/2018  -     TSH; Future; Expected date: 12/31/2018    Depression, unspecified depression type  Comments:  start effexor and re-evaluate in 1 month   Orders:  -     venlafaxine (EFFEXOR-XR) 37.5 MG 24 hr capsule; Take 1 capsule (37.5 mg total) by mouth once daily.  Dispense: 30 capsule; Refill: 1    Anxiety  Comments:  start effexor and re-evaluate in 1 month   Orders:  -     venlafaxine (EFFEXOR-XR) 37.5 MG 24 hr capsule; Take 1 capsule (37.5 mg total) by mouth once daily.  Dispense: 30 capsule; Refill: 1    Routine general medical examination at a health care facility    Hx of hysterectomy    Class 1 obesity without serious comorbidity with body mass index (BMI) of 30.0 to 30.9 in adult, unspecified obesity type  Comments:  encouraged diet and explained risks     Breast cancer screening  -     Mammo Digital Screening Bilateral With CAD; Future; Expected date: 12/31/2018    Need for Tdap vaccination  -     (In Office Administered) Tdap Vaccine        Problem List Items Addressed This Visit        Psychiatric    Anxiety    Relevant Medications    venlafaxine  (EFFEXOR-XR) 37.5 MG 24 hr capsule    Depression    Relevant Medications    venlafaxine (EFFEXOR-XR) 37.5 MG 24 hr capsule       Renal/    Breast cancer screening - Primary    Relevant Orders    Mammo Digital Screening Bilateral With CAD    Hx of hysterectomy       ID    Need for Tdap vaccination    Relevant Orders    (In Office Administered) Tdap Vaccine       Endocrine    Class 1 obesity without serious comorbidity with body mass index (BMI) of 30.0 to 30.9 in adult       Other    Routine general medical examination at a health care facility

## 2019-01-25 ENCOUNTER — PATIENT MESSAGE (OUTPATIENT)
Dept: INTERNAL MEDICINE | Facility: CLINIC | Age: 44
End: 2019-01-25

## 2019-01-29 ENCOUNTER — LAB VISIT (OUTPATIENT)
Dept: LAB | Facility: HOSPITAL | Age: 44
End: 2019-01-29
Attending: INTERNAL MEDICINE
Payer: COMMERCIAL

## 2019-01-29 DIAGNOSIS — Z00.00 PREVENTATIVE HEALTH CARE: ICD-10-CM

## 2019-01-29 LAB
ALBUMIN SERPL BCP-MCNC: 4 G/DL
ALP SERPL-CCNC: 74 U/L
ALT SERPL W/O P-5'-P-CCNC: 13 U/L
ANION GAP SERPL CALC-SCNC: 10 MMOL/L
AST SERPL-CCNC: 15 U/L
BASOPHILS # BLD AUTO: 0.01 K/UL
BASOPHILS NFR BLD: 0.4 %
BILIRUB SERPL-MCNC: 0.9 MG/DL
BUN SERPL-MCNC: 12 MG/DL
CALCIUM SERPL-MCNC: 9.6 MG/DL
CHLORIDE SERPL-SCNC: 103 MMOL/L
CHOLEST SERPL-MCNC: 202 MG/DL
CHOLEST/HDLC SERPL: 3.7 {RATIO}
CO2 SERPL-SCNC: 25 MMOL/L
CREAT SERPL-MCNC: 0.9 MG/DL
DIFFERENTIAL METHOD: ABNORMAL
EOSINOPHIL # BLD AUTO: 0 K/UL
EOSINOPHIL NFR BLD: 1.1 %
ERYTHROCYTE [DISTWIDTH] IN BLOOD BY AUTOMATED COUNT: 11.9 %
EST. GFR  (AFRICAN AMERICAN): >60 ML/MIN/1.73 M^2
EST. GFR  (NON AFRICAN AMERICAN): >60 ML/MIN/1.73 M^2
GLUCOSE SERPL-MCNC: 89 MG/DL
HCT VFR BLD AUTO: 38.8 %
HDLC SERPL-MCNC: 54 MG/DL
HDLC SERPL: 26.7 %
HGB BLD-MCNC: 13.1 G/DL
IMM GRANULOCYTES # BLD AUTO: 0 K/UL
IMM GRANULOCYTES NFR BLD AUTO: 0 %
LDLC SERPL CALC-MCNC: 121.4 MG/DL
LYMPHOCYTES # BLD AUTO: 0.8 K/UL
LYMPHOCYTES NFR BLD: 28.3 %
MCH RBC QN AUTO: 30.3 PG
MCHC RBC AUTO-ENTMCNC: 33.8 G/DL
MCV RBC AUTO: 90 FL
MONOCYTES # BLD AUTO: 0.3 K/UL
MONOCYTES NFR BLD: 11.6 %
NEUTROPHILS # BLD AUTO: 1.6 K/UL
NEUTROPHILS NFR BLD: 58.6 %
NONHDLC SERPL-MCNC: 148 MG/DL
NRBC BLD-RTO: 0 /100 WBC
PLATELET # BLD AUTO: 167 K/UL
PMV BLD AUTO: 11 FL
POTASSIUM SERPL-SCNC: 3.9 MMOL/L
PROT SERPL-MCNC: 7.5 G/DL
RBC # BLD AUTO: 4.33 M/UL
SODIUM SERPL-SCNC: 138 MMOL/L
TRIGL SERPL-MCNC: 133 MG/DL
TSH SERPL DL<=0.005 MIU/L-ACNC: 2.18 UIU/ML
WBC # BLD AUTO: 2.76 K/UL

## 2019-01-29 PROCEDURE — 85025 COMPLETE CBC W/AUTO DIFF WBC: CPT

## 2019-01-29 PROCEDURE — 80061 LIPID PANEL: CPT

## 2019-01-29 PROCEDURE — 80053 COMPREHEN METABOLIC PANEL: CPT

## 2019-01-29 PROCEDURE — 84443 ASSAY THYROID STIM HORMONE: CPT

## 2019-01-29 PROCEDURE — 36415 COLL VENOUS BLD VENIPUNCTURE: CPT | Mod: PO

## 2019-03-26 DIAGNOSIS — F32.A DEPRESSION, UNSPECIFIED DEPRESSION TYPE: ICD-10-CM

## 2019-03-26 DIAGNOSIS — F41.9 ANXIETY: ICD-10-CM

## 2019-03-26 RX ORDER — VENLAFAXINE HYDROCHLORIDE 37.5 MG/1
CAPSULE, EXTENDED RELEASE ORAL
Qty: 30 CAPSULE | Refills: 2 | Status: SHIPPED | OUTPATIENT
Start: 2019-03-26 | End: 2019-03-28 | Stop reason: SDUPTHER

## 2019-03-28 DIAGNOSIS — F41.9 ANXIETY: ICD-10-CM

## 2019-03-28 DIAGNOSIS — F32.A DEPRESSION, UNSPECIFIED DEPRESSION TYPE: ICD-10-CM

## 2019-03-28 RX ORDER — VENLAFAXINE HYDROCHLORIDE 37.5 MG/1
37.5 CAPSULE, EXTENDED RELEASE ORAL DAILY
Qty: 30 CAPSULE | Refills: 2 | Status: SHIPPED | OUTPATIENT
Start: 2019-03-28 | End: 2019-07-30 | Stop reason: SDUPTHER

## 2019-05-02 ENCOUNTER — TELEPHONE (OUTPATIENT)
Dept: ADMINISTRATIVE | Facility: HOSPITAL | Age: 44
End: 2019-05-02

## 2019-05-02 DIAGNOSIS — Z12.39 BREAST CANCER SCREENING: Primary | ICD-10-CM

## 2019-05-07 ENCOUNTER — PATIENT MESSAGE (OUTPATIENT)
Dept: FAMILY MEDICINE | Facility: CLINIC | Age: 44
End: 2019-05-07

## 2019-07-30 ENCOUNTER — OFFICE VISIT (OUTPATIENT)
Dept: INTERNAL MEDICINE | Facility: CLINIC | Age: 44
End: 2019-07-30
Payer: COMMERCIAL

## 2019-07-30 VITALS
OXYGEN SATURATION: 95 % | DIASTOLIC BLOOD PRESSURE: 88 MMHG | BODY MASS INDEX: 30.06 KG/M2 | HEART RATE: 75 BPM | HEIGHT: 61 IN | SYSTOLIC BLOOD PRESSURE: 124 MMHG | WEIGHT: 159.19 LBS

## 2019-07-30 DIAGNOSIS — R03.0 ELEVATED BP WITHOUT DIAGNOSIS OF HYPERTENSION: ICD-10-CM

## 2019-07-30 DIAGNOSIS — F32.A DEPRESSION, UNSPECIFIED DEPRESSION TYPE: ICD-10-CM

## 2019-07-30 DIAGNOSIS — E78.00 HYPERCHOLESTEROLEMIA: Primary | ICD-10-CM

## 2019-07-30 DIAGNOSIS — F41.9 ANXIETY: ICD-10-CM

## 2019-07-30 DIAGNOSIS — E66.3 OVERWEIGHT (BMI 25.0-29.9): ICD-10-CM

## 2019-07-30 PROCEDURE — 3008F PR BODY MASS INDEX (BMI) DOCUMENTED: ICD-10-PCS | Mod: CPTII,S$GLB,, | Performed by: INTERNAL MEDICINE

## 2019-07-30 PROCEDURE — 99214 PR OFFICE/OUTPT VISIT, EST, LEVL IV, 30-39 MIN: ICD-10-PCS | Mod: S$GLB,,, | Performed by: INTERNAL MEDICINE

## 2019-07-30 PROCEDURE — 99214 OFFICE O/P EST MOD 30 MIN: CPT | Mod: S$GLB,,, | Performed by: INTERNAL MEDICINE

## 2019-07-30 PROCEDURE — 99999 PR PBB SHADOW E&M-EST. PATIENT-LVL III: ICD-10-PCS | Mod: PBBFAC,,, | Performed by: INTERNAL MEDICINE

## 2019-07-30 PROCEDURE — 3008F BODY MASS INDEX DOCD: CPT | Mod: CPTII,S$GLB,, | Performed by: INTERNAL MEDICINE

## 2019-07-30 PROCEDURE — 99999 PR PBB SHADOW E&M-EST. PATIENT-LVL III: CPT | Mod: PBBFAC,,, | Performed by: INTERNAL MEDICINE

## 2019-07-30 RX ORDER — VENLAFAXINE HYDROCHLORIDE 37.5 MG/1
37.5 CAPSULE, EXTENDED RELEASE ORAL DAILY
Qty: 90 CAPSULE | Refills: 1 | Status: SHIPPED | OUTPATIENT
Start: 2019-07-30 | End: 2019-12-23 | Stop reason: SDUPTHER

## 2019-07-30 NOTE — PROGRESS NOTES
Answers for HPI/ROS submitted by the patient on 7/29/2019   activity change: No  unexpected weight change: No  rhinorrhea: No  trouble swallowing: No  visual disturbance: No  chest tightness: No  polyuria: No  difficulty urinating: No  menstrual problem: No  joint swelling: No  arthralgias: No  confusion: No  dysphoric mood: No  Pt. ID: Maureen Gordillo is a 43 y.o. female      Chief complaint:   Chief Complaint   Patient presents with    Follow-up       HPI: Pt. Here for f/u for depression and anxiety ; she has started effexor and it is helping; ; I reviewed labs dated 1/29/19; cholesterol is mildly elevated; BP is borderline elevated and she states her salt intake is high; weight is elevated but stable       Review of Systems   HENT: Negative for hearing loss.    Eyes: Negative for discharge.   Respiratory: Negative for wheezing.    Cardiovascular: Negative for chest pain and palpitations.   Gastrointestinal: Negative for blood in stool, constipation, diarrhea and vomiting.   Genitourinary: Negative for dysuria and hematuria.   Musculoskeletal: Negative for neck pain.   Neurological: Negative for weakness and headaches.   Endo/Heme/Allergies: Negative for polydipsia.   Psychiatric/Behavioral: Positive for depression. Negative for suicidal ideas. The patient is nervous/anxious.          Objective:    Physical Exam   Constitutional: She is oriented to person, place, and time. She appears well-developed.   Eyes: EOM are normal.   Neck: Normal range of motion.   Cardiovascular: Normal rate, regular rhythm and normal heart sounds.   Pulmonary/Chest: Effort normal and breath sounds normal.   Abdominal: Soft. There is no tenderness. There is no rebound and no guarding.   Musculoskeletal: Normal range of motion.   Neurological: She is alert and oriented to person, place, and time.   Skin: No rash noted.         Health Maintenance   Topic Date Due    Mammogram  09/14/2017    Influenza Vaccine  08/01/2019    TETANUS  VACCINE  12/31/2028    Lipid Panel  Completed         Assessment:     1. Hypercholesterolemia Active   2. Elevated BP without diagnosis of hypertension Active   3. Anxiety Well controlled   4. Depression, unspecified depression type Well controlled   5. Overweight (BMI 25.0-29.9) Sub-optimally controlled         Plan: Hypercholesterolemia  Comments:  encouraged low cholesterol diet     Elevated BP without diagnosis of hypertension  Comments:  encouraged low Na diet and weight loss; repeat BP on f/u in 4 months    Anxiety  Comments:  continue effexor     Depression, unspecified depression type  Comments:  continue effexor   Orders:  -     venlafaxine (EFFEXOR-XR) 37.5 MG 24 hr capsule; Take 1 capsule (37.5 mg total) by mouth once daily.  Dispense: 90 capsule; Refill: 1    Overweight (BMI 25.0-29.9)  Comments:  encouraged diet         Problem List Items Addressed This Visit        Psychiatric    Anxiety    Depression    Relevant Medications    venlafaxine (EFFEXOR-XR) 37.5 MG 24 hr capsule       Cardiac/Vascular    Hypercholesterolemia - Primary    Elevated BP without diagnosis of hypertension       Endocrine    Overweight (BMI 25.0-29.9)

## 2019-12-23 ENCOUNTER — OFFICE VISIT (OUTPATIENT)
Dept: FAMILY MEDICINE | Facility: CLINIC | Age: 44
End: 2019-12-23
Payer: COMMERCIAL

## 2019-12-23 VITALS
WEIGHT: 160.69 LBS | BODY MASS INDEX: 30.34 KG/M2 | TEMPERATURE: 98 F | OXYGEN SATURATION: 98 % | DIASTOLIC BLOOD PRESSURE: 76 MMHG | HEART RATE: 75 BPM | HEIGHT: 61 IN | SYSTOLIC BLOOD PRESSURE: 122 MMHG

## 2019-12-23 DIAGNOSIS — F41.9 ANXIETY AND DEPRESSION: ICD-10-CM

## 2019-12-23 DIAGNOSIS — E66.09 CLASS 1 OBESITY DUE TO EXCESS CALORIES WITHOUT SERIOUS COMORBIDITY WITH BODY MASS INDEX (BMI) OF 30.0 TO 30.9 IN ADULT: ICD-10-CM

## 2019-12-23 DIAGNOSIS — E78.2 MIXED HYPERLIPIDEMIA: ICD-10-CM

## 2019-12-23 DIAGNOSIS — Z00.00 ANNUAL PHYSICAL EXAM: Primary | ICD-10-CM

## 2019-12-23 DIAGNOSIS — F32.A ANXIETY AND DEPRESSION: ICD-10-CM

## 2019-12-23 DIAGNOSIS — F32.A DEPRESSION, UNSPECIFIED DEPRESSION TYPE: ICD-10-CM

## 2019-12-23 PROBLEM — Z90.710 HX OF HYSTERECTOMY: Status: RESOLVED | Noted: 2018-12-31 | Resolved: 2019-12-23

## 2019-12-23 PROCEDURE — 3008F BODY MASS INDEX DOCD: CPT | Mod: CPTII,S$GLB,, | Performed by: INTERNAL MEDICINE

## 2019-12-23 PROCEDURE — 3008F PR BODY MASS INDEX (BMI) DOCUMENTED: ICD-10-PCS | Mod: CPTII,S$GLB,, | Performed by: INTERNAL MEDICINE

## 2019-12-23 PROCEDURE — 90471 FLU VACCINE (QUAD) GREATER THAN OR EQUAL TO 3YO PRESERVATIVE FREE IM: ICD-10-PCS | Mod: S$GLB,,, | Performed by: INTERNAL MEDICINE

## 2019-12-23 PROCEDURE — 90471 IMMUNIZATION ADMIN: CPT | Mod: S$GLB,,, | Performed by: INTERNAL MEDICINE

## 2019-12-23 PROCEDURE — 90686 FLU VACCINE (QUAD) GREATER THAN OR EQUAL TO 3YO PRESERVATIVE FREE IM: ICD-10-PCS | Mod: S$GLB,,, | Performed by: INTERNAL MEDICINE

## 2019-12-23 PROCEDURE — 99999 PR PBB SHADOW E&M-EST. PATIENT-LVL III: CPT | Mod: PBBFAC,,, | Performed by: INTERNAL MEDICINE

## 2019-12-23 PROCEDURE — 99999 PR PBB SHADOW E&M-EST. PATIENT-LVL III: ICD-10-PCS | Mod: PBBFAC,,, | Performed by: INTERNAL MEDICINE

## 2019-12-23 PROCEDURE — 99214 PR OFFICE/OUTPT VISIT, EST, LEVL IV, 30-39 MIN: ICD-10-PCS | Mod: 25,S$GLB,, | Performed by: INTERNAL MEDICINE

## 2019-12-23 PROCEDURE — 90686 IIV4 VACC NO PRSV 0.5 ML IM: CPT | Mod: S$GLB,,, | Performed by: INTERNAL MEDICINE

## 2019-12-23 PROCEDURE — 99214 OFFICE O/P EST MOD 30 MIN: CPT | Mod: 25,S$GLB,, | Performed by: INTERNAL MEDICINE

## 2019-12-23 RX ORDER — VENLAFAXINE HYDROCHLORIDE 37.5 MG/1
37.5 CAPSULE, EXTENDED RELEASE ORAL DAILY
Qty: 90 CAPSULE | Refills: 3 | Status: SHIPPED | OUTPATIENT
Start: 2019-12-23 | End: 2020-12-05

## 2019-12-23 NOTE — PROGRESS NOTES
[facility] Note    Chief Complaint      Chief Complaint   Patient presents with    Establish Care     History of Present Illness      Maureen Gordillo is a 44 y.o. female who presents today to establish care for anxiety/depression.  Patient comes to appointment alone.     Problem List Items Addressed This Visit     Anxiety and depression    Current Assessment & Plan     Stable on effexor 37.5 mg daily, no panic attacks/SI/HI.  Felt sleepy at higher dose.  Sleeps pretty well at night.         Relevant Medications    venlafaxine (EFFEXOR-XR) 37.5 MG 24 hr capsule    Mixed hyperlipidemia    Current Assessment & Plan     Not on meds.          Relevant Orders    Lipid panel    Class 1 obesity without serious comorbidity with body mass index (BMI) of 30.0 to 30.9 in adult    Current Assessment & Plan     Not exercising 2/2 time constraints, has been at this weight for last several years.  Could improve on diet, doesn't watch her diet.           Other Visit Diagnoses     Annual physical exam    -  Primary    Relevant Orders    CBC auto differential    Comprehensive metabolic panel    Depression, unspecified depression type   (Well controlled)      continue effexor         Health Maintenance   Topic Date Due    Mammogram  2021    TETANUS VACCINE  2028    Lipid Panel  Completed     Past Medical History:   Diagnosis Date    Anxiety     Depression     Hx of hysterectomy 2018     Past Surgical History:   Procedure Laterality Date     SECTION      HYSTERECTOMY  2015     family history includes Hypertension in her father and mother.    Social History     Tobacco Use    Smoking status: Never Smoker    Smokeless tobacco: Never Used   Substance Use Topics    Alcohol use: Yes     Alcohol/week: 3.0 standard drinks     Types: 3 Glasses of wine per week     Comment: daily    Drug use: No       Review of Systems   Constitutional: Negative for chills and fever.   HENT:  "Negative for congestion and sore throat.    Eyes: Negative for blurred vision and discharge.   Respiratory: Negative for cough and shortness of breath.    Cardiovascular: Negative for chest pain and palpitations.   Gastrointestinal: Negative for constipation, diarrhea, nausea and vomiting.   Genitourinary: Negative for dysuria and hematuria.   Musculoskeletal: Negative for falls and myalgias.   Skin: Negative for itching and rash.   Neurological: Negative for dizziness and headaches.        Outpatient Encounter Medications as of 12/23/2019   Medication Sig Dispense Refill    venlafaxine (EFFEXOR-XR) 37.5 MG 24 hr capsule Take 1 capsule (37.5 mg total) by mouth once daily. 90 capsule 3    [DISCONTINUED] venlafaxine (EFFEXOR-XR) 37.5 MG 24 hr capsule Take 1 capsule (37.5 mg total) by mouth once daily. 90 capsule 1     No facility-administered encounter medications on file as of 12/23/2019.         Review of patient's allergies indicates:  No Known Allergies    Physical Exam      Vital Signs  Temp: 97.9 °F (36.6 °C)  Temp src: Oral  Pulse: 75  SpO2: 98 %  BP: 122/76  BP Location: Left arm  Patient Position: Sitting  Pain Score: 0-No pain  Height and Weight  Height: 5' 1" (154.9 cm)  Weight: 72.9 kg (160 lb 11.5 oz)  BSA (Calculated - sq m): 1.77 sq meters  BMI (Calculated): 30.4  Weight in (lb) to have BMI = 25: 132]    Physical Exam   Constitutional: She is oriented to person, place, and time. She appears well-developed and well-nourished.   HENT:   Head: Normocephalic and atraumatic.   Right Ear: External ear normal.   Left Ear: External ear normal.   Eyes: Right eye exhibits no discharge. Left eye exhibits no discharge.   Neck: Normal range of motion. No thyromegaly present.   Cardiovascular: Normal rate, regular rhythm, normal heart sounds and intact distal pulses.   No murmur heard.  Pulmonary/Chest: Effort normal and breath sounds normal. No respiratory distress.   Abdominal: Soft. Bowel sounds are normal. She " exhibits no distension. There is no tenderness.   Musculoskeletal: Normal range of motion. She exhibits no deformity.   Neurological: She is alert and oriented to person, place, and time.   Skin: Skin is warm and dry. No rash noted.   Psychiatric: She has a normal mood and affect. Her behavior is normal.        Laboratory:  CBC:  No results for input(s): WBC, RBC, HGB, HCT, PLT, MCV, MCH, MCHC in the last 2160 hours.  CMP:  No results for input(s): GLU, CALCIUM, ALBUMIN, PROT, NA, K, CO2, CL, BUN, ALKPHOS, ALT, AST, BILITOT in the last 2160 hours.    Invalid input(s): CREATININ  URINALYSIS:  No results for input(s): COLORU, CLARITYU, SPECGRAV, PHUR, PROTEINUA, GLUCOSEU, BILIRUBINCON, BLOODU, WBCU, RBCU, BACTERIA, MUCUS, NITRITE, LEUKOCYTESUR, UROBILINOGEN, HYALINECASTS in the last 2160 hours.   LIPIDS:  No results for input(s): TSH, HDL, CHOL, TRIG, LDLCALC, CHOLHDL, NONHDLCHOL, TOTALCHOLEST in the last 2160 hours.  TSH:  No results for input(s): TSH in the last 2160 hours.  A1C:  No results for input(s): HGBA1C in the last 2160 hours.    Radiology:  12/23/2019 MMG: WNL    Assessment/Plan     Maureen Gordillo is a 44 y.o.female with:    1. Mixed hyperlipidemia  - Lipid panel; Future    2. Anxiety and depression  - venlafaxine (EFFEXOR-XR) 37.5 MG 24 hr capsule; Take 1 capsule (37.5 mg total) by mouth once daily.  Dispense: 90 capsule; Refill: 3    3. Annual physical exam  - CBC auto differential; Future  - Comprehensive metabolic panel; Future    4. Class 1 obesity due to excess calories without serious comorbidity with body mass index (BMI) of 30.0 to 30.9 in adult    5. Depression, unspecified depression type    -f/u MMG results  -will do labs downstairs in late January 2020  -Continue current medications and maintain follow up with specialists.  Return to clinic in 1 year      Luna Arechiga MD  [facility] [place]    Answers for HPI/ROS submitted by the patient on 12/22/2019   activity change:  No  unexpected weight change: No  rhinorrhea: No  trouble swallowing: No  visual disturbance: No  chest tightness: No  polyuria: No  difficulty urinating: No  menstrual problem: No  joint swelling: No  arthralgias: No  confusion: No  dysphoric mood: No

## 2019-12-23 NOTE — ASSESSMENT & PLAN NOTE
Stable on effexor 37.5 mg daily, no panic attacks/SI/HI.  Felt sleepy at higher dose.  Sleeps pretty well at night.

## 2019-12-23 NOTE — ASSESSMENT & PLAN NOTE
Not exercising 2/2 time constraints, has been at this weight for last several years.  Could improve on diet, doesn't watch her diet.

## 2019-12-26 ENCOUNTER — TELEPHONE (OUTPATIENT)
Dept: FAMILY MEDICINE | Facility: CLINIC | Age: 44
End: 2019-12-26

## 2020-05-13 ENCOUNTER — OFFICE VISIT (OUTPATIENT)
Dept: FAMILY MEDICINE | Facility: CLINIC | Age: 45
End: 2020-05-13
Payer: COMMERCIAL

## 2020-05-13 VITALS
OXYGEN SATURATION: 98 % | HEART RATE: 84 BPM | TEMPERATURE: 98 F | WEIGHT: 165 LBS | BODY MASS INDEX: 31.18 KG/M2 | SYSTOLIC BLOOD PRESSURE: 118 MMHG | DIASTOLIC BLOOD PRESSURE: 74 MMHG

## 2020-05-13 DIAGNOSIS — L23.7 POISON IVY DERMATITIS: Primary | ICD-10-CM

## 2020-05-13 PROCEDURE — 99999 PR PBB SHADOW E&M-EST. PATIENT-LVL III: CPT | Mod: PBBFAC,,, | Performed by: INTERNAL MEDICINE

## 2020-05-13 PROCEDURE — 3008F PR BODY MASS INDEX (BMI) DOCUMENTED: ICD-10-PCS | Mod: CPTII,S$GLB,, | Performed by: INTERNAL MEDICINE

## 2020-05-13 PROCEDURE — 99213 PR OFFICE/OUTPT VISIT, EST, LEVL III, 20-29 MIN: ICD-10-PCS | Mod: S$GLB,,, | Performed by: INTERNAL MEDICINE

## 2020-05-13 PROCEDURE — 3008F BODY MASS INDEX DOCD: CPT | Mod: CPTII,S$GLB,, | Performed by: INTERNAL MEDICINE

## 2020-05-13 PROCEDURE — 99999 PR PBB SHADOW E&M-EST. PATIENT-LVL III: ICD-10-PCS | Mod: PBBFAC,,, | Performed by: INTERNAL MEDICINE

## 2020-05-13 PROCEDURE — 99213 OFFICE O/P EST LOW 20 MIN: CPT | Mod: S$GLB,,, | Performed by: INTERNAL MEDICINE

## 2020-05-13 RX ORDER — PREDNISONE 20 MG/1
20 TABLET ORAL DAILY
Qty: 5 TABLET | Refills: 0 | Status: SHIPPED | OUTPATIENT
Start: 2020-05-13 | End: 2021-11-10

## 2020-05-13 RX ORDER — MUPIROCIN 20 MG/G
OINTMENT TOPICAL 3 TIMES DAILY
Qty: 15 G | Refills: 1 | Status: SHIPPED | OUTPATIENT
Start: 2020-05-13 | End: 2021-01-27

## 2020-05-13 NOTE — PROGRESS NOTES
[facility] Note    Chief Complaint      Chief Complaint   Patient presents with    Poison Ivy     History of Present Illness      Maureen Gordillo is a 44 y.o. female who presents today for rash.  Patient comes to appointment alone.     Was clearing property on the [facility] 8 days ago.  Started 2 days later with blister/bump on feet/legs.  Lots of itching .  Some drainage of clear fluid.  Some redness around a few of the lesions.  No fever.   No new lesions since Monday  Problem List Items Addressed This Visit     None      Visit Diagnoses     Poison ivy dermatitis    -  Primary    Relevant Medications    predniSONE (DELTASONE) 20 MG tablet    mupirocin (BACTROBAN) 2 % ointment        Health Maintenance   Topic Date Due    Mammogram  2021    TETANUS VACCINE  2028    Lipid Panel  Completed     Past Medical History:   Diagnosis Date    Anxiety     Depression     Hx of hysterectomy 2018     Past Surgical History:   Procedure Laterality Date     SECTION      HYSTERECTOMY  2015     family history includes Hypertension in her father and mother.    Social History     Tobacco Use    Smoking status: Never Smoker    Smokeless tobacco: Never Used   Substance Use Topics    Alcohol use: Yes     Alcohol/week: 3.0 standard drinks     Types: 3 Glasses of wine per week     Comment: daily    Drug use: No       Review of Systems   Constitutional: Negative for chills and fever.   HENT: Negative for congestion and sore throat.    Eyes: Negative for blurred vision and discharge.   Respiratory: Negative for cough and shortness of breath.    Cardiovascular: Negative for chest pain and palpitations.   Gastrointestinal: Negative for constipation, diarrhea, nausea and vomiting.   Genitourinary: Negative for dysuria and hematuria.   Musculoskeletal: Negative for falls and myalgias.   Skin: Negative for itching and rash.   Neurological: Negative for dizziness and headaches.         Outpatient Encounter Medications as of 5/13/2020   Medication Sig Dispense Refill    venlafaxine (EFFEXOR-XR) 37.5 MG 24 hr capsule Take 1 capsule (37.5 mg total) by mouth once daily. 90 capsule 3    mupirocin (BACTROBAN) 2 % ointment Apply topically 3 (three) times daily. 15 g 1    predniSONE (DELTASONE) 20 MG tablet Take 1 tablet (20 mg total) by mouth once daily. 5 tablet 0     No facility-administered encounter medications on file as of 5/13/2020.         Review of patient's allergies indicates:  No Known Allergies    Physical Exam      Vital Signs  Temp: 97.7 °F (36.5 °C)  Temp src: Oral  Pulse: 84  SpO2: 98 %  BP: 118/74  BP Location: Left arm  Patient Position: Sitting  Pain Score: 0-No pain  Height and Weight  Weight: 74.9 kg (165 lb 0.2 oz)]    Physical Exam   Constitutional: She is oriented to person, place, and time. She appears well-developed and well-nourished.   HENT:   Head: Normocephalic and atraumatic.   Right Ear: External ear normal.   Left Ear: External ear normal.   Cardiovascular: Normal rate, regular rhythm, normal heart sounds and intact distal pulses.   No murmur heard.  Pulmonary/Chest: Effort normal and breath sounds normal. No respiratory distress.   Musculoskeletal: Normal range of motion. She exhibits no deformity.   Neurological: She is alert and oriented to person, place, and time.   Skin: Skin is warm and dry. No rash noted.   Small open vesicles on base or erythema, on back of legs and feet.  No surrounding erythema.   Psychiatric: She has a normal mood and affect. Her behavior is normal.        Laboratory:  CBC:  No results for input(s): WBC, RBC, HGB, HCT, PLT, MCV, MCH, MCHC in the last 2160 hours.  CMP:  No results for input(s): GLU, CALCIUM, ALBUMIN, PROT, NA, K, CO2, CL, BUN, ALKPHOS, ALT, AST, BILITOT in the last 2160 hours.    Invalid input(s): CREATININ  URINALYSIS:  No results for input(s): COLORU, CLARITYU, SPECGRAV, PHUR, PROTEINUA, GLUCOSEU, BILIRUBINCON,  BLOODU, WBCU, RBCU, BACTERIA, MUCUS, NITRITE, LEUKOCYTESUR, UROBILINOGEN, HYALINECASTS in the last 2160 hours.   LIPIDS:  No results for input(s): TSH, HDL, CHOL, TRIG, LDLCALC, CHOLHDL, NONHDLCHOL, TOTALCHOLEST in the last 2160 hours.  TSH:  No results for input(s): TSH in the last 2160 hours.  A1C:  No results for input(s): HGBA1C in the last 2160 hours.    Radiology:  12/23/2019 MMG: WNL    Assessment/Plan     Maureen Gordillo is a 44 y.o.female with:    1. Poison ivy dermatitis  - predniSONE (DELTASONE) 20 MG tablet; Take 1 tablet (20 mg total) by mouth once daily.  Dispense: 5 tablet; Refill: 0  - mupirocin (BACTROBAN) 2 % ointment; Apply topically 3 (three) times daily.  Dispense: 15 g; Refill: 1    -schedule labs  -Continue current medications and maintain follow up with specialists.  Return to clinic JALEN Arechiga MD  [facility] [place]

## 2021-01-20 DIAGNOSIS — Z12.31 OTHER SCREENING MAMMOGRAM: ICD-10-CM

## 2021-04-03 ENCOUNTER — IMMUNIZATION (OUTPATIENT)
Dept: FAMILY MEDICINE | Facility: CLINIC | Age: 46
End: 2021-04-03
Payer: COMMERCIAL

## 2021-04-03 DIAGNOSIS — Z23 NEED FOR VACCINATION: Primary | ICD-10-CM

## 2021-04-03 PROCEDURE — 91300 COVID-19, MRNA, LNP-S, PF, 30 MCG/0.3 ML DOSE VACCINE: CPT | Mod: PBBFAC | Performed by: FAMILY MEDICINE

## 2021-04-05 ENCOUNTER — PATIENT MESSAGE (OUTPATIENT)
Dept: ADMINISTRATIVE | Facility: HOSPITAL | Age: 46
End: 2021-04-05

## 2021-04-24 ENCOUNTER — IMMUNIZATION (OUTPATIENT)
Dept: FAMILY MEDICINE | Facility: CLINIC | Age: 46
End: 2021-04-24
Payer: COMMERCIAL

## 2021-04-24 DIAGNOSIS — Z23 NEED FOR VACCINATION: Primary | ICD-10-CM

## 2021-04-24 PROCEDURE — 91300 COVID-19, MRNA, LNP-S, PF, 30 MCG/0.3 ML DOSE VACCINE: CPT | Mod: PBBFAC | Performed by: FAMILY MEDICINE

## 2021-04-24 PROCEDURE — 0002A COVID-19, MRNA, LNP-S, PF, 30 MCG/0.3 ML DOSE VACCINE: CPT | Mod: PBBFAC | Performed by: FAMILY MEDICINE

## 2021-07-07 ENCOUNTER — PATIENT MESSAGE (OUTPATIENT)
Dept: ADMINISTRATIVE | Facility: HOSPITAL | Age: 46
End: 2021-07-07

## 2021-08-05 ENCOUNTER — HOSPITAL ENCOUNTER (EMERGENCY)
Facility: HOSPITAL | Age: 46
Discharge: HOME | End: 2021-08-05
Attending: EMERGENCY MEDICINE
Payer: COMMERCIAL

## 2021-08-05 VITALS
HEART RATE: 61 BPM | RESPIRATION RATE: 18 BRPM | OXYGEN SATURATION: 100 % | BODY MASS INDEX: 27.38 KG/M2 | TEMPERATURE: 98 F | SYSTOLIC BLOOD PRESSURE: 175 MMHG | HEIGHT: 61 IN | DIASTOLIC BLOOD PRESSURE: 75 MMHG | WEIGHT: 145 LBS

## 2021-08-05 DIAGNOSIS — R11.0 NAUSEA: ICD-10-CM

## 2021-08-05 DIAGNOSIS — R10.13 EPIGASTRIC ABDOMINAL PAIN: Primary | ICD-10-CM

## 2021-08-05 LAB
ALBUMIN SERPL BCP-MCNC: 5 G/DL (ref 3.5–5.2)
ALP SERPL-CCNC: 79 U/L (ref 55–135)
ALT SERPL W/O P-5'-P-CCNC: 16 U/L (ref 10–44)
ANION GAP SERPL CALC-SCNC: 16 MMOL/L (ref 8–16)
AST SERPL-CCNC: 19 U/L (ref 10–40)
BASOPHILS # BLD AUTO: 0.01 K/UL (ref 0–0.2)
BASOPHILS NFR BLD: 0.2 % (ref 0–1.9)
BILIRUB SERPL-MCNC: 0.9 MG/DL (ref 0.1–1)
BILIRUB UR QL STRIP: NEGATIVE
BUN SERPL-MCNC: 11 MG/DL (ref 6–20)
CALCIUM SERPL-MCNC: 11.6 MG/DL (ref 8.7–10.5)
CHLORIDE SERPL-SCNC: 102 MMOL/L (ref 95–110)
CLARITY UR: CLEAR
CO2 SERPL-SCNC: 20 MMOL/L (ref 23–29)
COLOR UR: YELLOW
CREAT SERPL-MCNC: 0.8 MG/DL (ref 0.5–1.4)
DIFFERENTIAL METHOD: ABNORMAL
EOSINOPHIL # BLD AUTO: 0 K/UL (ref 0–0.5)
EOSINOPHIL NFR BLD: 0.2 % (ref 0–8)
ERYTHROCYTE [DISTWIDTH] IN BLOOD BY AUTOMATED COUNT: 12.1 % (ref 11.5–14.5)
EST. GFR  (AFRICAN AMERICAN): >60 ML/MIN/1.73 M^2
EST. GFR  (NON AFRICAN AMERICAN): >60 ML/MIN/1.73 M^2
GLUCOSE SERPL-MCNC: 103 MG/DL (ref 70–110)
GLUCOSE UR QL STRIP: NEGATIVE
HCT VFR BLD AUTO: 42 % (ref 37–48.5)
HGB BLD-MCNC: 14.5 G/DL (ref 12–16)
HGB UR QL STRIP: NEGATIVE
IMM GRANULOCYTES # BLD AUTO: 0.01 K/UL (ref 0–0.04)
IMM GRANULOCYTES NFR BLD AUTO: 0.2 % (ref 0–0.5)
KETONES UR QL STRIP: ABNORMAL
LEUKOCYTE ESTERASE UR QL STRIP: NEGATIVE
LIPASE SERPL-CCNC: 55 U/L (ref 4–60)
LYMPHOCYTES # BLD AUTO: 0.4 K/UL (ref 1–4.8)
LYMPHOCYTES NFR BLD: 7.6 % (ref 18–48)
MCH RBC QN AUTO: 31 PG (ref 27–31)
MCHC RBC AUTO-ENTMCNC: 34.5 G/DL (ref 32–36)
MCV RBC AUTO: 90 FL (ref 82–98)
MONOCYTES # BLD AUTO: 0.2 K/UL (ref 0.3–1)
MONOCYTES NFR BLD: 3.3 % (ref 4–15)
NEUTROPHILS # BLD AUTO: 5.1 K/UL (ref 1.8–7.7)
NEUTROPHILS NFR BLD: 88.5 % (ref 38–73)
NITRITE UR QL STRIP: NEGATIVE
NRBC BLD-RTO: 0 /100 WBC
PH UR STRIP: 7 [PH] (ref 5–8)
PLATELET # BLD AUTO: 224 K/UL (ref 150–450)
PMV BLD AUTO: 10.4 FL (ref 9.2–12.9)
POTASSIUM SERPL-SCNC: 3.7 MMOL/L (ref 3.5–5.1)
PROT SERPL-MCNC: 8.7 G/DL (ref 6–8.4)
PROT UR QL STRIP: NEGATIVE
RBC # BLD AUTO: 4.67 M/UL (ref 4–5.4)
SODIUM SERPL-SCNC: 138 MMOL/L (ref 136–145)
SP GR UR STRIP: 1.02 (ref 1–1.03)
URN SPEC COLLECT METH UR: ABNORMAL
UROBILINOGEN UR STRIP-ACNC: NEGATIVE EU/DL
WBC # BLD AUTO: 5.79 K/UL (ref 3.9–12.7)

## 2021-08-05 PROCEDURE — 93005 ELECTROCARDIOGRAM TRACING: CPT

## 2021-08-05 PROCEDURE — 96375 TX/PRO/DX INJ NEW DRUG ADDON: CPT

## 2021-08-05 PROCEDURE — 25000003 PHARM REV CODE 250: Performed by: PHYSICIAN ASSISTANT

## 2021-08-05 PROCEDURE — 83690 ASSAY OF LIPASE: CPT | Performed by: PHYSICIAN ASSISTANT

## 2021-08-05 PROCEDURE — 96361 HYDRATE IV INFUSION ADD-ON: CPT

## 2021-08-05 PROCEDURE — 81003 URINALYSIS AUTO W/O SCOPE: CPT | Performed by: PHYSICIAN ASSISTANT

## 2021-08-05 PROCEDURE — 85025 COMPLETE CBC W/AUTO DIFF WBC: CPT | Performed by: PHYSICIAN ASSISTANT

## 2021-08-05 PROCEDURE — 99284 EMERGENCY DEPT VISIT MOD MDM: CPT | Mod: 25

## 2021-08-05 PROCEDURE — 63600175 PHARM REV CODE 636 W HCPCS: Performed by: PHYSICIAN ASSISTANT

## 2021-08-05 PROCEDURE — 96374 THER/PROPH/DIAG INJ IV PUSH: CPT

## 2021-08-05 PROCEDURE — 80053 COMPREHEN METABOLIC PANEL: CPT | Performed by: PHYSICIAN ASSISTANT

## 2021-08-05 PROCEDURE — 93010 ELECTROCARDIOGRAM REPORT: CPT | Mod: ,,, | Performed by: INTERNAL MEDICINE

## 2021-08-05 PROCEDURE — 93010 EKG 12-LEAD: ICD-10-PCS | Mod: ,,, | Performed by: INTERNAL MEDICINE

## 2021-08-05 RX ORDER — ONDANSETRON 2 MG/ML
8 INJECTION INTRAMUSCULAR; INTRAVENOUS
Status: COMPLETED | OUTPATIENT
Start: 2021-08-05 | End: 2021-08-05

## 2021-08-05 RX ORDER — KETOROLAC TROMETHAMINE 30 MG/ML
15 INJECTION, SOLUTION INTRAMUSCULAR; INTRAVENOUS
Status: COMPLETED | OUTPATIENT
Start: 2021-08-05 | End: 2021-08-05

## 2021-08-05 RX ORDER — ONDANSETRON 4 MG/1
4 TABLET, ORALLY DISINTEGRATING ORAL EVERY 6 HOURS PRN
Qty: 20 TABLET | Refills: 0 | Status: SHIPPED | OUTPATIENT
Start: 2021-08-05 | End: 2021-11-10

## 2021-08-05 RX ORDER — PANTOPRAZOLE SODIUM 20 MG/1
20 TABLET, DELAYED RELEASE ORAL DAILY
Qty: 30 TABLET | Refills: 0 | Status: SHIPPED | OUTPATIENT
Start: 2021-08-05 | End: 2021-11-10

## 2021-08-05 RX ADMIN — ONDANSETRON 8 MG: 2 INJECTION INTRAMUSCULAR; INTRAVENOUS at 05:08

## 2021-08-05 RX ADMIN — SODIUM CHLORIDE 1000 ML: 0.9 INJECTION, SOLUTION INTRAVENOUS at 05:08

## 2021-08-05 RX ADMIN — KETOROLAC TROMETHAMINE 15 MG: 30 INJECTION, SOLUTION INTRAMUSCULAR; INTRAVENOUS at 05:08

## 2021-10-05 ENCOUNTER — PATIENT MESSAGE (OUTPATIENT)
Dept: ADMINISTRATIVE | Facility: HOSPITAL | Age: 46
End: 2021-10-05

## 2021-11-10 ENCOUNTER — OFFICE VISIT (OUTPATIENT)
Dept: FAMILY MEDICINE | Facility: CLINIC | Age: 46
End: 2021-11-10
Payer: COMMERCIAL

## 2021-11-10 DIAGNOSIS — E66.09 CLASS 1 OBESITY DUE TO EXCESS CALORIES WITHOUT SERIOUS COMORBIDITY WITH BODY MASS INDEX (BMI) OF 31.0 TO 31.9 IN ADULT: ICD-10-CM

## 2021-11-10 DIAGNOSIS — Z00.00 ANNUAL PHYSICAL EXAM: Primary | ICD-10-CM

## 2021-11-10 DIAGNOSIS — E78.2 MIXED HYPERLIPIDEMIA: ICD-10-CM

## 2021-11-10 DIAGNOSIS — F32.A ANXIETY AND DEPRESSION: ICD-10-CM

## 2021-11-10 DIAGNOSIS — Z23 NEED FOR INFLUENZA VACCINATION: ICD-10-CM

## 2021-11-10 DIAGNOSIS — F41.9 ANXIETY AND DEPRESSION: ICD-10-CM

## 2021-11-10 DIAGNOSIS — Z11.59 SCREENING FOR VIRAL DISEASE: ICD-10-CM

## 2021-11-10 PROCEDURE — 90686 IIV4 VACC NO PRSV 0.5 ML IM: CPT | Mod: S$GLB,,, | Performed by: INTERNAL MEDICINE

## 2021-11-10 PROCEDURE — 90686 FLU VACCINE (QUAD) GREATER THAN OR EQUAL TO 3YO PRESERVATIVE FREE IM: ICD-10-PCS | Mod: S$GLB,,, | Performed by: INTERNAL MEDICINE

## 2021-11-10 PROCEDURE — 99396 PREV VISIT EST AGE 40-64: CPT | Mod: 25,S$GLB,, | Performed by: INTERNAL MEDICINE

## 2021-11-10 PROCEDURE — 1159F MED LIST DOCD IN RCRD: CPT | Mod: CPTII,S$GLB,, | Performed by: INTERNAL MEDICINE

## 2021-11-10 PROCEDURE — 90471 FLU VACCINE (QUAD) GREATER THAN OR EQUAL TO 3YO PRESERVATIVE FREE IM: ICD-10-PCS | Mod: S$GLB,,, | Performed by: INTERNAL MEDICINE

## 2021-11-10 PROCEDURE — 99999 PR PBB SHADOW E&M-EST. PATIENT-LVL III: CPT | Mod: PBBFAC,,, | Performed by: INTERNAL MEDICINE

## 2021-11-10 PROCEDURE — 90471 IMMUNIZATION ADMIN: CPT | Mod: S$GLB,,, | Performed by: INTERNAL MEDICINE

## 2021-11-10 PROCEDURE — 99396 PR PREVENTIVE VISIT,EST,40-64: ICD-10-PCS | Mod: 25,S$GLB,, | Performed by: INTERNAL MEDICINE

## 2021-11-10 PROCEDURE — 1159F PR MEDICATION LIST DOCUMENTED IN MEDICAL RECORD: ICD-10-PCS | Mod: CPTII,S$GLB,, | Performed by: INTERNAL MEDICINE

## 2021-11-10 PROCEDURE — 99999 PR PBB SHADOW E&M-EST. PATIENT-LVL III: ICD-10-PCS | Mod: PBBFAC,,, | Performed by: INTERNAL MEDICINE

## 2021-11-10 RX ORDER — BUPROPION HYDROCHLORIDE 150 MG/1
150 TABLET ORAL DAILY
Qty: 90 TABLET | Refills: 3 | Status: SHIPPED | OUTPATIENT
Start: 2021-11-10 | End: 2021-12-08

## 2021-12-03 ENCOUNTER — OFFICE VISIT (OUTPATIENT)
Dept: DERMATOLOGY | Facility: CLINIC | Age: 46
End: 2021-12-03
Payer: COMMERCIAL

## 2021-12-03 DIAGNOSIS — L71.9 ROSACEA: Primary | ICD-10-CM

## 2021-12-03 PROCEDURE — 99204 OFFICE O/P NEW MOD 45 MIN: CPT | Mod: 95,,, | Performed by: DERMATOLOGY

## 2021-12-03 PROCEDURE — 99204 PR OFFICE/OUTPT VISIT, NEW, LEVL IV, 45-59 MIN: ICD-10-PCS | Mod: 95,,, | Performed by: DERMATOLOGY

## 2021-12-03 RX ORDER — DOXYCYCLINE 100 MG/1
100 CAPSULE ORAL 2 TIMES DAILY
Qty: 60 CAPSULE | Refills: 2 | Status: SHIPPED | OUTPATIENT
Start: 2021-12-03 | End: 2022-07-07

## 2021-12-03 RX ORDER — IVERMECTIN 10 MG/G
1 CREAM TOPICAL DAILY
Qty: 45 G | Refills: 5 | Status: SHIPPED | OUTPATIENT
Start: 2021-12-03 | End: 2022-07-07

## 2021-12-06 ENCOUNTER — TELEPHONE (OUTPATIENT)
Dept: DERMATOLOGY | Facility: CLINIC | Age: 46
End: 2021-12-06
Payer: COMMERCIAL

## 2021-12-08 ENCOUNTER — OFFICE VISIT (OUTPATIENT)
Dept: FAMILY MEDICINE | Facility: CLINIC | Age: 46
End: 2021-12-08
Payer: COMMERCIAL

## 2021-12-08 DIAGNOSIS — F41.9 ANXIETY AND DEPRESSION: Primary | ICD-10-CM

## 2021-12-08 DIAGNOSIS — F32.A ANXIETY AND DEPRESSION: Primary | ICD-10-CM

## 2021-12-08 PROCEDURE — 99214 OFFICE O/P EST MOD 30 MIN: CPT | Mod: 95,,, | Performed by: INTERNAL MEDICINE

## 2021-12-08 PROCEDURE — 99214 PR OFFICE/OUTPT VISIT, EST, LEVL IV, 30-39 MIN: ICD-10-PCS | Mod: 95,,, | Performed by: INTERNAL MEDICINE

## 2021-12-08 RX ORDER — ESCITALOPRAM OXALATE 10 MG/1
10 TABLET ORAL DAILY
Qty: 90 TABLET | Refills: 3 | Status: SHIPPED | OUTPATIENT
Start: 2021-12-08 | End: 2022-11-22 | Stop reason: SDUPTHER

## 2022-01-05 ENCOUNTER — PATIENT MESSAGE (OUTPATIENT)
Dept: FAMILY MEDICINE | Facility: CLINIC | Age: 47
End: 2022-01-05
Payer: COMMERCIAL

## 2022-01-10 ENCOUNTER — PATIENT MESSAGE (OUTPATIENT)
Dept: ADMINISTRATIVE | Facility: HOSPITAL | Age: 47
End: 2022-01-10
Payer: COMMERCIAL

## 2022-02-01 ENCOUNTER — TELEPHONE (OUTPATIENT)
Dept: DERMATOLOGY | Facility: CLINIC | Age: 47
End: 2022-02-01
Payer: COMMERCIAL

## 2022-02-01 ENCOUNTER — PATIENT MESSAGE (OUTPATIENT)
Dept: DERMATOLOGY | Facility: CLINIC | Age: 47
End: 2022-02-01
Payer: COMMERCIAL

## 2022-02-03 ENCOUNTER — PATIENT MESSAGE (OUTPATIENT)
Dept: DERMATOLOGY | Facility: CLINIC | Age: 47
End: 2022-02-03

## 2022-02-03 ENCOUNTER — OFFICE VISIT (OUTPATIENT)
Dept: DERMATOLOGY | Facility: CLINIC | Age: 47
End: 2022-02-03
Payer: COMMERCIAL

## 2022-02-03 DIAGNOSIS — L71.9 ROSACEA: Primary | ICD-10-CM

## 2022-02-03 PROCEDURE — 99214 OFFICE O/P EST MOD 30 MIN: CPT | Mod: 95,,, | Performed by: DERMATOLOGY

## 2022-02-03 PROCEDURE — 99214 PR OFFICE/OUTPT VISIT, EST, LEVL IV, 30-39 MIN: ICD-10-PCS | Mod: 95,,, | Performed by: DERMATOLOGY

## 2022-02-03 NOTE — Clinical Note
[email or web address]  Triple rosacea cream with azelaic acid, metronidazole, ivermectin  Apply thin layer to entire face once daily.  30 g tube with 5 refills

## 2022-02-03 NOTE — PROGRESS NOTES
Subjective:       Patient ID:  Maureen Gordillo is a 46 y.o. female who presents for No chief complaint on file.    HPI   2 month virtual f/u for rosacea. Improved with Soolantra; difficulty tolerating PO doxy so has been inconsistent. Still not at goal.     Review of Systems   Constitutional: Negative for malaise.   Skin: Positive for rash.        Objective:    Physical Exam   Constitutional: She appears well-developed and well-nourished. No distress.   Neurological: She is alert and oriented to person, place, and time. She is not disoriented.   Psychiatric: She has a normal mood and affect.   Skin:   Areas Examined (abnormalities noted in diagram):   Head / Face Inspection Performed              Diagram Legend     Erythematous scaling macule/papule c/w actinic keratosis       Vascular papule c/w angioma      Pigmented verrucoid papule/plaque c/w seborrheic keratosis      Yellow umbilicated papule c/w sebaceous hyperplasia      Irregularly shaped tan macule c/w lentigo     1-2 mm smooth white papules consistent with Milia      Movable subcutaneous cyst with punctum c/w epidermal inclusion cyst      Subcutaneous movable cyst c/w pilar cyst      Firm pink to brown papule c/w dermatofibroma      Pedunculated fleshy papule(s) c/w skin tag(s)      Evenly pigmented macule c/w junctional nevus     Mildly variegated pigmented, slightly irregular-bordered macule c/w mildly atypical nevus      Flesh colored to evenly pigmented papule c/w intradermal nevus       Pink pearly papule/plaque c/w basal cell carcinoma      Erythematous hyperkeratotic cursted plaque c/w SCC      Surgical scar with no sign of skin cancer recurrence      Open and closed comedones      Inflammatory papules and pustules      Verrucoid papule consistent consistent with wart     Erythematous eczematous patches and plaques     Dystrophic onycholytic nail with subungual debris c/w onychomycosis     Umbilicated papule    Erythematous-base heme-crusted tan  verrucoid plaque consistent with inflamed seborrheic keratosis     Erythematous Silvery Scaling Plaque c/w Psoriasis     See annotation    Today      LV 12/2021      Assessment / Plan:        Rosacea  -     minocycline 4 % Foam; Apply 1 application topically once daily.  Dispense: 1 each; Refill: 5    Improved but still with papulopustular component; not at goal  - Discussed diagnosis, etiology, and treatment options.   - Start minocycline 4% foam once daily; copay card provided.   - Transition from Soolantra to SM Triple Rosacea Cream once daily.   - Counseled on potential SE of medication(s) and instructed on use.   - Counseled on potential SE of medication(s) and instructed on use.   - [place], gentle skin care otherwise.   - Photo protect.   - Avoid triggers.          Follow up in about 3 months (around 5/3/2022).    The patient location is: home  The chief complaint leading to consultation is: rosacea    Visit type: audiovisual    Face to Face time with patient: 7 mins   12 minutes of total time spent on the encounter, which includes face to face time and non-face to face time preparing to see the patient (eg, review of tests), Obtaining and/or reviewing separately obtained history, Documenting clinical information in the electronic or other health record, Independently interpreting results (not separately reported) and communicating results to the patient/family/caregiver, or Care coordination (not separately reported).     Each patient to whom he or she provides medical services by telemedicine is:  (1) informed of the relationship between the physician and patient and the respective role of any other health care provider with respect to management of the patient; and (2) notified that he or she may decline to receive medical services by telemedicine and may withdraw from such care at any time.

## 2022-02-04 ENCOUNTER — TELEPHONE (OUTPATIENT)
Dept: PHARMACY | Facility: CLINIC | Age: 47
End: 2022-02-04
Payer: COMMERCIAL

## 2022-02-11 ENCOUNTER — TELEPHONE (OUTPATIENT)
Dept: PHARMACY | Facility: CLINIC | Age: 47
End: 2022-02-11
Payer: COMMERCIAL

## 2022-03-23 DIAGNOSIS — Z12.31 OTHER SCREENING MAMMOGRAM: ICD-10-CM

## 2022-04-27 ENCOUNTER — TELEPHONE (OUTPATIENT)
Dept: OPTOMETRY | Facility: CLINIC | Age: 47
End: 2022-04-27
Payer: COMMERCIAL

## 2022-04-28 ENCOUNTER — OFFICE VISIT (OUTPATIENT)
Dept: OPTOMETRY | Facility: CLINIC | Age: 47
End: 2022-04-28
Payer: COMMERCIAL

## 2022-04-28 DIAGNOSIS — Z97.3 WEARS CONTACT LENSES: ICD-10-CM

## 2022-04-28 DIAGNOSIS — H52.4 MYOPIA WITH PRESBYOPIA OF LEFT EYE: ICD-10-CM

## 2022-04-28 DIAGNOSIS — Z46.0 FITTING AND ADJUSTMENT OF SPECTACLES AND CONTACT LENSES: Primary | ICD-10-CM

## 2022-04-28 DIAGNOSIS — H52.4 MYOPIA OF RIGHT EYE WITH ASTIGMATISM AND PRESBYOPIA: ICD-10-CM

## 2022-04-28 DIAGNOSIS — H04.123 DRY EYE SYNDROME OF BOTH EYES: Primary | ICD-10-CM

## 2022-04-28 DIAGNOSIS — H52.11 MYOPIA OF RIGHT EYE WITH ASTIGMATISM AND PRESBYOPIA: ICD-10-CM

## 2022-04-28 DIAGNOSIS — H52.12 MYOPIA WITH PRESBYOPIA OF LEFT EYE: ICD-10-CM

## 2022-04-28 DIAGNOSIS — H52.201 MYOPIA OF RIGHT EYE WITH ASTIGMATISM AND PRESBYOPIA: ICD-10-CM

## 2022-04-28 PROCEDURE — 1160F RVW MEDS BY RX/DR IN RCRD: CPT | Mod: CPTII,S$GLB,, | Performed by: OPTOMETRIST

## 2022-04-28 PROCEDURE — 1159F PR MEDICATION LIST DOCUMENTED IN MEDICAL RECORD: ICD-10-PCS | Mod: CPTII,S$GLB,, | Performed by: OPTOMETRIST

## 2022-04-28 PROCEDURE — 92004 PR EYE EXAM, NEW PATIENT,COMPREHESV: ICD-10-PCS | Mod: S$GLB,,, | Performed by: OPTOMETRIST

## 2022-04-28 PROCEDURE — 1159F MED LIST DOCD IN RCRD: CPT | Mod: CPTII,S$GLB,, | Performed by: OPTOMETRIST

## 2022-04-28 PROCEDURE — 92015 PR REFRACTION: ICD-10-PCS | Mod: S$GLB,,, | Performed by: OPTOMETRIST

## 2022-04-28 PROCEDURE — 92015 DETERMINE REFRACTIVE STATE: CPT | Mod: S$GLB,,, | Performed by: OPTOMETRIST

## 2022-04-28 PROCEDURE — 92310 PR CONTACT LENS FITTING (NO CHANGE): ICD-10-PCS | Mod: CSM,S$GLB,, | Performed by: OPTOMETRIST

## 2022-04-28 PROCEDURE — 92310 CONTACT LENS FITTING OU: CPT | Mod: CSM,S$GLB,, | Performed by: OPTOMETRIST

## 2022-04-28 PROCEDURE — 99999 PR PBB SHADOW E&M-EST. PATIENT-LVL II: CPT | Mod: PBBFAC,,, | Performed by: OPTOMETRIST

## 2022-04-28 PROCEDURE — 92004 COMPRE OPH EXAM NEW PT 1/>: CPT | Mod: S$GLB,,, | Performed by: OPTOMETRIST

## 2022-04-28 PROCEDURE — 1160F PR REVIEW ALL MEDS BY PRESCRIBER/CLIN PHARMACIST DOCUMENTED: ICD-10-PCS | Mod: CPTII,S$GLB,, | Performed by: OPTOMETRIST

## 2022-04-28 PROCEDURE — 99499 NO LOS: ICD-10-PCS | Mod: S$GLB,,, | Performed by: OPTOMETRIST

## 2022-04-28 PROCEDURE — 99999 PR PBB SHADOW E&M-EST. PATIENT-LVL II: ICD-10-PCS | Mod: PBBFAC,,, | Performed by: OPTOMETRIST

## 2022-04-28 PROCEDURE — 99499 UNLISTED E&M SERVICE: CPT | Mod: S$GLB,,, | Performed by: OPTOMETRIST

## 2022-04-28 NOTE — PROGRESS NOTES
HPI     CC: Pt is here today for a routine eye exam with contact lens fitting.   She states that she is doing okay with her current prescription. She is   wearing monthly contact lenses now. She has noticed her near vision has   decreased some. She uses OTC reading glasses on top of Cls as needed.     ETIENNE: 2 years    (+) Changes in vision, near  (-) Pain  (+) Irritation   (-) Itching   (-) Flashes  (-) Floaters  (+) Glasses wearer  (+) CL wearer  (+) Uses eye gtts, Visine prn OU, Allergy gtts prn OU    Does patient want a refraction today? Yes    (-) Eye injury  (-) Eye surgery   (-)POHx  (-)FOHx    (-)DM           Last edited by Janey Pizano, OD on 4/28/2022 11:49 AM. (History)            Assessment /Plan     For exam results, see Encounter Report.    Dry eye syndrome of both eyes    Myopia of right eye with astigmatism and presbyopia    Myopia with presbyopia of left eye      1. Educated pt on findings. Recommend ATs TID-QID for added lubrication and comfort. OTC pataday prn for allergy symptoms. D/c visine, ATs instead.  Will switch from monthly CLs to daily CLs due to dry eyes.   If symptoms worsen or dont improve, RTC. Monitor.     2-3. Updated SRx. Given option of DVO specs and remove prn for reading or FTW PALs. Educated on possible PAL adaptation symptoms. Monitor yearly.   Updated CL Rx. Initially good comfort and vision. Given CL trials to take home. If happy with comfort and vision of trial lenses, may order annual supply. If issues arise, RTC for CL f/u. Reviewed proper CL care and hygiene. Monitor yearly unless changes noted sooner.       RTC in 1 year for annual eye exam or sooner if needed.

## 2022-04-28 NOTE — PROGRESS NOTES
Assessment /Plan     For exam results, see Encounter Report.    Fitting and adjustment of spectacles and contact lenses    Wears contact lenses                     Contact lens exam done, see exam of same date for documentation.

## 2022-05-01 ENCOUNTER — PATIENT MESSAGE (OUTPATIENT)
Dept: OPTOMETRY | Facility: CLINIC | Age: 47
End: 2022-05-01
Payer: COMMERCIAL

## 2022-05-10 ENCOUNTER — PATIENT MESSAGE (OUTPATIENT)
Dept: OBSTETRICS AND GYNECOLOGY | Facility: CLINIC | Age: 47
End: 2022-05-10
Payer: COMMERCIAL

## 2022-05-30 ENCOUNTER — OFFICE VISIT (OUTPATIENT)
Dept: PODIATRY | Facility: CLINIC | Age: 47
End: 2022-05-30
Payer: COMMERCIAL

## 2022-05-30 VITALS
HEART RATE: 78 BPM | WEIGHT: 170.13 LBS | SYSTOLIC BLOOD PRESSURE: 119 MMHG | DIASTOLIC BLOOD PRESSURE: 78 MMHG | HEIGHT: 61 IN | BODY MASS INDEX: 32.12 KG/M2

## 2022-05-30 DIAGNOSIS — R23.4 FISSURE IN SKIN: Primary | ICD-10-CM

## 2022-05-30 PROCEDURE — 99999 PR PBB SHADOW E&M-EST. PATIENT-LVL III: ICD-10-PCS | Mod: PBBFAC,,, | Performed by: PODIATRIST

## 2022-05-30 PROCEDURE — 1159F PR MEDICATION LIST DOCUMENTED IN MEDICAL RECORD: ICD-10-PCS | Mod: CPTII,S$GLB,, | Performed by: PODIATRIST

## 2022-05-30 PROCEDURE — 3008F BODY MASS INDEX DOCD: CPT | Mod: CPTII,S$GLB,, | Performed by: PODIATRIST

## 2022-05-30 PROCEDURE — 99999 PR PBB SHADOW E&M-EST. PATIENT-LVL III: CPT | Mod: PBBFAC,,, | Performed by: PODIATRIST

## 2022-05-30 PROCEDURE — 1160F PR REVIEW ALL MEDS BY PRESCRIBER/CLIN PHARMACIST DOCUMENTED: ICD-10-PCS | Mod: CPTII,S$GLB,, | Performed by: PODIATRIST

## 2022-05-30 PROCEDURE — 3074F PR MOST RECENT SYSTOLIC BLOOD PRESSURE < 130 MM HG: ICD-10-PCS | Mod: CPTII,S$GLB,, | Performed by: PODIATRIST

## 2022-05-30 PROCEDURE — 3074F SYST BP LT 130 MM HG: CPT | Mod: CPTII,S$GLB,, | Performed by: PODIATRIST

## 2022-05-30 PROCEDURE — 99203 PR OFFICE/OUTPT VISIT, NEW, LEVL III, 30-44 MIN: ICD-10-PCS | Mod: S$GLB,,, | Performed by: PODIATRIST

## 2022-05-30 PROCEDURE — 3008F PR BODY MASS INDEX (BMI) DOCUMENTED: ICD-10-PCS | Mod: CPTII,S$GLB,, | Performed by: PODIATRIST

## 2022-05-30 PROCEDURE — 1160F RVW MEDS BY RX/DR IN RCRD: CPT | Mod: CPTII,S$GLB,, | Performed by: PODIATRIST

## 2022-05-30 PROCEDURE — 3078F PR MOST RECENT DIASTOLIC BLOOD PRESSURE < 80 MM HG: ICD-10-PCS | Mod: CPTII,S$GLB,, | Performed by: PODIATRIST

## 2022-05-30 PROCEDURE — 3078F DIAST BP <80 MM HG: CPT | Mod: CPTII,S$GLB,, | Performed by: PODIATRIST

## 2022-05-30 PROCEDURE — 1159F MED LIST DOCD IN RCRD: CPT | Mod: CPTII,S$GLB,, | Performed by: PODIATRIST

## 2022-05-30 PROCEDURE — 99203 OFFICE O/P NEW LOW 30 MIN: CPT | Mod: S$GLB,,, | Performed by: PODIATRIST

## 2022-05-30 NOTE — PROGRESS NOTES
Subjective:      Patient ID: Maureen Gordillo is a 46 y.o. female.    Chief Complaint: Toe Pain (Patient presents today complaining of discomfort in her 5th toe left foot.)      46 y.o. female presenting with L 5th toe skin lesion. Points lateral aspect of 5th toe. Noticed dry skin. Denies pain. Denies recent injury. In open toe shoe.    Review of Systems   Constitutional: Negative for chills, decreased appetite, fever and malaise/fatigue.   HENT: Negative for congestion, ear discharge and sore throat.    Eyes: Negative for discharge and pain.   Cardiovascular: Negative for chest pain, claudication and leg swelling.   Respiratory: Negative for cough and shortness of breath.    Skin: Positive for dry skin. Negative for color change, nail changes and rash.   Musculoskeletal: Negative for arthritis, joint pain, joint swelling and muscle weakness.   Gastrointestinal: Negative for bloating, abdominal pain, diarrhea, nausea and vomiting.   Genitourinary: Negative for flank pain and hematuria.   Neurological: Negative for headaches, numbness and weakness.   Psychiatric/Behavioral: Negative for altered mental status.             Past Medical History:   Diagnosis Date    Anxiety     Depression     Hx of hysterectomy 2018       Past Surgical History:   Procedure Laterality Date     SECTION      HYSTERECTOMY  2015       Family History   Problem Relation Age of Onset    Hypertension Mother     Heart disease Mother     Hypertension Father     Melanoma Neg Hx        Social History     Socioeconomic History    Marital status:    Tobacco Use    Smoking status: Never Smoker    Smokeless tobacco: Never Used   Substance and Sexual Activity    Alcohol use: Yes     Alcohol/week: 3.0 standard drinks     Types: 3 Glasses of wine per week     Comment: daily    Drug use: No    Sexual activity: Yes     Partners: Male     Social Determinants of Health     Financial Resource Strain: Low Risk      "Difficulty of Paying Living Expenses: Not hard at all   Food Insecurity: No Food Insecurity    Worried About Running Out of Food in the Last Year: Never true    Ran Out of Food in the Last Year: Never true   Transportation Needs: No Transportation Needs    Lack of Transportation (Medical): No    Lack of Transportation (Non-Medical): No   Physical Activity: Unknown    Days of Exercise per Week: 0 days   Stress: Stress Concern Present    Feeling of Stress : Very much   Social Connections: Unknown    Frequency of Communication with Friends and Family: More than three times a week    Frequency of Social Gatherings with Friends and Family: Twice a week    Active Member of Clubs or Organizations: No    Attends Club or Organization Meetings: Never    Marital Status:    Housing Stability: Low Risk     Unable to Pay for Housing in the Last Year: No    Number of Places Lived in the Last Year: 1    Unstable Housing in the Last Year: No       Current Outpatient Medications   Medication Sig Dispense Refill    EScitalopram oxalate (LEXAPRO) 10 MG tablet Take 1 tablet (10 mg total) by mouth once daily. Take 1/2 tablet daily for the first week then increase whole tablet. 90 tablet 3    ivermectin (SOOLANTRA) 1 % Crea Apply 1 application topically once daily. 45 g 5    minocycline 4 % Foam Apply 1 application topically once daily. 30 g 5    doxycycline (VIBRAMYCIN) 100 MG Cap Take 1 capsule (100 mg total) by mouth 2 (two) times a day. Take with food; do not lay down 1-2 hours after taking, caution in sun. 60 capsule 2     No current facility-administered medications for this visit.       Review of patient's allergies indicates:  No Known Allergies    Vitals:    05/30/22 1402   BP: 119/78   Pulse: 78   Weight: 77.2 kg (170 lb 1.6 oz)   Height: 5' 1" (1.549 m)   PainSc: 0-No pain       Objective:      Physical Exam  Constitutional:       General: She is not in acute distress.     Appearance: She is " well-developed.   HENT:      Nose: Nose normal.   Eyes:      Conjunctiva/sclera: Conjunctivae normal.   Pulmonary:      Effort: Pulmonary effort is normal.   Chest:      Chest wall: No tenderness.   Abdominal:      Tenderness: There is no abdominal tenderness.   Musculoskeletal:      Cervical back: Normal range of motion.   Neurological:      Mental Status: She is alert and oriented to person, place, and time.   Psychiatric:         Behavior: Behavior normal.         Vascular: Distal DP/PT pulses palpable 2/4. CRT < 3 sec to tips of toes. No vericosities noted to LEs. Hair growth present LE, warm to touch LE, No edema noted to LE.    Dermatologic: No open lesions, lacerations or wounds. Interdigital spaces clean, dry and intact. No erythema, rubor, calor noted LE  L foot: dry skin lateral 5th toe. Fissure noted.     Musculoskeletal: MMT 5/5 in DF/PF/Inv/Ev resistance with no reproduction of pain in any direction. Passive range of motion of ankle and pedal joints is painless. No calf tenderness LE, Compartments soft/compressible.     Neurological: Light touch, proprioception, and sharp/dull sensation are all intact. Protective threshold with the Houston-Wienstein monofilament is intact. Vibratory sensation intact.         Assessment:       Encounter Diagnosis   Name Primary?    Fissure in skin Yes         Plan:       Maureen was seen today for toe pain.    Diagnoses and all orders for this visit:    Fissure in skin      I counseled the patient on her conditions, their implications and medical management.    46 y.o. female with L foot 5th toe fissure.    -noted dry skin noted 5th toe laterally. Recommend vasaline, soaking with filing with pumice stone.    -The nature of the condition, options for management, as well as potential risks and complications were discussed in detail with patient. Patient was amenable to my recommendations and left my office fully informed and will follow up as instructed or sooner if  necessary.    -Patient was advised of signs and symptoms of infection including redness, drainage, purulence, odor, streaking, fever, chills and I advised patient to seek medical attention (ER or urgent care) if these symptoms arise.   -f/u prn    Note dictated with voice recognition software, please excuse any grammatical errors.

## 2022-07-07 ENCOUNTER — OFFICE VISIT (OUTPATIENT)
Dept: OBSTETRICS AND GYNECOLOGY | Facility: CLINIC | Age: 47
End: 2022-07-07
Payer: COMMERCIAL

## 2022-07-07 VITALS
DIASTOLIC BLOOD PRESSURE: 91 MMHG | SYSTOLIC BLOOD PRESSURE: 129 MMHG | BODY MASS INDEX: 33.12 KG/M2 | WEIGHT: 175.25 LBS

## 2022-07-07 DIAGNOSIS — Z12.4 SCREENING FOR CERVICAL CANCER: ICD-10-CM

## 2022-07-07 DIAGNOSIS — Z12.31 ENCOUNTER FOR SCREENING MAMMOGRAM FOR BREAST CANCER: ICD-10-CM

## 2022-07-07 DIAGNOSIS — Z01.419 WELL WOMAN EXAM WITH ROUTINE GYNECOLOGICAL EXAM: Primary | ICD-10-CM

## 2022-07-07 PROCEDURE — 1160F RVW MEDS BY RX/DR IN RCRD: CPT | Mod: CPTII,S$GLB,, | Performed by: OBSTETRICS & GYNECOLOGY

## 2022-07-07 PROCEDURE — 1159F MED LIST DOCD IN RCRD: CPT | Mod: CPTII,S$GLB,, | Performed by: OBSTETRICS & GYNECOLOGY

## 2022-07-07 PROCEDURE — 3008F PR BODY MASS INDEX (BMI) DOCUMENTED: ICD-10-PCS | Mod: CPTII,S$GLB,, | Performed by: OBSTETRICS & GYNECOLOGY

## 2022-07-07 PROCEDURE — 99386 PREV VISIT NEW AGE 40-64: CPT | Mod: S$GLB,,, | Performed by: OBSTETRICS & GYNECOLOGY

## 2022-07-07 PROCEDURE — 3008F BODY MASS INDEX DOCD: CPT | Mod: CPTII,S$GLB,, | Performed by: OBSTETRICS & GYNECOLOGY

## 2022-07-07 PROCEDURE — 3074F SYST BP LT 130 MM HG: CPT | Mod: CPTII,S$GLB,, | Performed by: OBSTETRICS & GYNECOLOGY

## 2022-07-07 PROCEDURE — 99386 PR PREVENTIVE VISIT,NEW,40-64: ICD-10-PCS | Mod: S$GLB,,, | Performed by: OBSTETRICS & GYNECOLOGY

## 2022-07-07 PROCEDURE — 3080F PR MOST RECENT DIASTOLIC BLOOD PRESSURE >= 90 MM HG: ICD-10-PCS | Mod: CPTII,S$GLB,, | Performed by: OBSTETRICS & GYNECOLOGY

## 2022-07-07 PROCEDURE — 99999 PR PBB SHADOW E&M-EST. PATIENT-LVL III: CPT | Mod: PBBFAC,,, | Performed by: OBSTETRICS & GYNECOLOGY

## 2022-07-07 PROCEDURE — 3080F DIAST BP >= 90 MM HG: CPT | Mod: CPTII,S$GLB,, | Performed by: OBSTETRICS & GYNECOLOGY

## 2022-07-07 PROCEDURE — 1160F PR REVIEW ALL MEDS BY PRESCRIBER/CLIN PHARMACIST DOCUMENTED: ICD-10-PCS | Mod: CPTII,S$GLB,, | Performed by: OBSTETRICS & GYNECOLOGY

## 2022-07-07 PROCEDURE — 88175 CYTOPATH C/V AUTO FLUID REDO: CPT | Performed by: OBSTETRICS & GYNECOLOGY

## 2022-07-07 PROCEDURE — 3074F PR MOST RECENT SYSTOLIC BLOOD PRESSURE < 130 MM HG: ICD-10-PCS | Mod: CPTII,S$GLB,, | Performed by: OBSTETRICS & GYNECOLOGY

## 2022-07-07 PROCEDURE — 1159F PR MEDICATION LIST DOCUMENTED IN MEDICAL RECORD: ICD-10-PCS | Mod: CPTII,S$GLB,, | Performed by: OBSTETRICS & GYNECOLOGY

## 2022-07-07 PROCEDURE — 99999 PR PBB SHADOW E&M-EST. PATIENT-LVL III: ICD-10-PCS | Mod: PBBFAC,,, | Performed by: OBSTETRICS & GYNECOLOGY

## 2022-07-07 NOTE — PROGRESS NOTES
GYNECOLOGY OFFICE NOTE    Reason for visit: annual    HPI: Pt is a 46 y.o.  female  who presents for annual. Menarche: 14. Pt with hx of hysterectomy secondary to endometriosis.. She is sexually active. She does not desire STI screening. She denies vaginal discharge.  Last pap: , denies hx of abnormal. Last MMG 2022- negative.     Past Medical History:   Diagnosis Date    Anxiety     Depression     Hx of hysterectomy 2018       Past Surgical History:   Procedure Laterality Date     SECTION      HYSTERECTOMY  2015    supracervical       Family History   Problem Relation Age of Onset    Hypertension Mother     Heart disease Mother     Hypertension Father     Melanoma Neg Hx     Breast cancer Neg Hx     Colon cancer Neg Hx     Ovarian cancer Neg Hx        Social History     Tobacco Use    Smoking status: Never Smoker    Smokeless tobacco: Never Used   Substance Use Topics    Alcohol use: Yes     Alcohol/week: 3.0 standard drinks     Types: 3 Glasses of wine per week     Comment: daily    Drug use: No       OB History    Para Term  AB Living   4 3 3   1 3   SAB IAB Ectopic Multiple Live Births   1       3      # Outcome Date GA Lbr Hector/2nd Weight Sex Delivery Anes PTL Lv   4 SAB            3 Term      CS-Unspec   DASIA   2 Term      CS-Unspec   DASIA   1 Term      CS-Unspec   DASIA       Current Outpatient Medications   Medication Sig    EScitalopram oxalate (LEXAPRO) 10 MG tablet Take 1 tablet (10 mg total) by mouth once daily. Take 1/2 tablet daily for the first week then increase whole tablet.    minocycline 4 % Foam Apply 1 application topically once daily. (Patient not taking: Reported on 2022)     No current facility-administered medications for this visit.       Allergies: Patient has no known allergies.     BP (!) 129/91   Wt 79.5 kg (175 lb 4.3 oz)   LMP 10/17/2015   BMI 33.12 kg/m²     ROS:  GENERAL: Denies fever or chills.   SKIN: Denies  rash or lesions.   HEAD: Denies head injury or headache.   CHEST: Denies chest pain or shortness of breath.   CARDIOVASCULAR: Denies palpitations or chest pain.   ABDOMEN: No constipation, diarrhea, nausea, vomiting or rectal bleeding.   URINARY: No dysuria, hematuria, or burning on urination.  REPRODUCTIVE: See HPI.   BREASTS: see HPI  NEUROLOGIC: Denies syncope or weakness.     Physical Exam:  GENERAL: alert, appears stated age and cooperative  NEUROLOGIC: orientated to person, place and time, normal mood and affect   CHEST: Normal respiratory effort  NECK: normal appearance  SKIN: no acne, hirsutism  BREAST EXAM: breasts appear normal, no suspicious masses, no skin or nipple changes or axillary nodes  ABDOMEN: abdomen is soft without significant tenderness, masses  EXTERNAL GENITALIA:  normal general appearance  URETHRA: normal urethra, normal urethral meatus  VAGINA:  normal mucosa, no  lesions  CERVIX:  Normal  UTERUS:  surgically absent  ADNEXA: nontender    Diagnosis:  1. Well woman exam with routine gynecological exam    2. Encounter for screening mammogram for breast cancer    3. Screening for cervical cancer        Plan:   1. Annual  2. mmg ordered  3. Pap today    Orders Placed This Encounter    Mammo Digital Screening Bilat w/ Edis    Liquid-Based Pap Smear, Screening           Mavis Ruiz MD  OB/GYN

## 2022-07-11 LAB
FINAL PATHOLOGIC DIAGNOSIS: NORMAL
Lab: NORMAL

## 2022-08-01 ENCOUNTER — PATIENT MESSAGE (OUTPATIENT)
Dept: OPTOMETRY | Facility: CLINIC | Age: 47
End: 2022-08-01
Payer: COMMERCIAL

## 2022-08-28 ENCOUNTER — PATIENT MESSAGE (OUTPATIENT)
Dept: OPTOMETRY | Facility: CLINIC | Age: 47
End: 2022-08-28
Payer: COMMERCIAL

## 2022-09-21 ENCOUNTER — PATIENT MESSAGE (OUTPATIENT)
Dept: INTERNAL MEDICINE | Facility: CLINIC | Age: 47
End: 2022-09-21
Payer: COMMERCIAL

## 2022-11-21 ENCOUNTER — PATIENT MESSAGE (OUTPATIENT)
Dept: ADMINISTRATIVE | Facility: HOSPITAL | Age: 47
End: 2022-11-21
Payer: COMMERCIAL

## 2022-11-22 ENCOUNTER — OFFICE VISIT (OUTPATIENT)
Dept: INTERNAL MEDICINE | Facility: CLINIC | Age: 47
End: 2022-11-22
Payer: COMMERCIAL

## 2022-11-22 VITALS
SYSTOLIC BLOOD PRESSURE: 122 MMHG | BODY MASS INDEX: 32.13 KG/M2 | HEART RATE: 79 BPM | HEIGHT: 61 IN | DIASTOLIC BLOOD PRESSURE: 82 MMHG | WEIGHT: 170.19 LBS | OXYGEN SATURATION: 99 % | TEMPERATURE: 98 F

## 2022-11-22 DIAGNOSIS — F41.9 ANXIETY AND DEPRESSION: ICD-10-CM

## 2022-11-22 DIAGNOSIS — F32.A ANXIETY AND DEPRESSION: ICD-10-CM

## 2022-11-22 DIAGNOSIS — Z23 NEED FOR INFLUENZA VACCINATION: ICD-10-CM

## 2022-11-22 DIAGNOSIS — E66.09 CLASS 1 OBESITY DUE TO EXCESS CALORIES WITHOUT SERIOUS COMORBIDITY WITH BODY MASS INDEX (BMI) OF 32.0 TO 32.9 IN ADULT: ICD-10-CM

## 2022-11-22 DIAGNOSIS — Z00.00 ANNUAL PHYSICAL EXAM: Primary | ICD-10-CM

## 2022-11-22 DIAGNOSIS — Z12.11 SCREENING FOR MALIGNANT NEOPLASM OF COLON: ICD-10-CM

## 2022-11-22 DIAGNOSIS — E78.2 MIXED HYPERLIPIDEMIA: ICD-10-CM

## 2022-11-22 PROCEDURE — 3079F DIAST BP 80-89 MM HG: CPT | Mod: CPTII,S$GLB,, | Performed by: INTERNAL MEDICINE

## 2022-11-22 PROCEDURE — 3008F PR BODY MASS INDEX (BMI) DOCUMENTED: ICD-10-PCS | Mod: CPTII,S$GLB,, | Performed by: INTERNAL MEDICINE

## 2022-11-22 PROCEDURE — 3079F PR MOST RECENT DIASTOLIC BLOOD PRESSURE 80-89 MM HG: ICD-10-PCS | Mod: CPTII,S$GLB,, | Performed by: INTERNAL MEDICINE

## 2022-11-22 PROCEDURE — 3008F BODY MASS INDEX DOCD: CPT | Mod: CPTII,S$GLB,, | Performed by: INTERNAL MEDICINE

## 2022-11-22 PROCEDURE — 90471 FLU VACCINE (QUAD) GREATER THAN OR EQUAL TO 3YO PRESERVATIVE FREE IM: ICD-10-PCS | Mod: S$GLB,,, | Performed by: INTERNAL MEDICINE

## 2022-11-22 PROCEDURE — 90686 IIV4 VACC NO PRSV 0.5 ML IM: CPT | Mod: S$GLB,,, | Performed by: INTERNAL MEDICINE

## 2022-11-22 PROCEDURE — 99396 PR PREVENTIVE VISIT,EST,40-64: ICD-10-PCS | Mod: 25,S$GLB,, | Performed by: INTERNAL MEDICINE

## 2022-11-22 PROCEDURE — 1159F PR MEDICATION LIST DOCUMENTED IN MEDICAL RECORD: ICD-10-PCS | Mod: CPTII,S$GLB,, | Performed by: INTERNAL MEDICINE

## 2022-11-22 PROCEDURE — 90471 IMMUNIZATION ADMIN: CPT | Mod: S$GLB,,, | Performed by: INTERNAL MEDICINE

## 2022-11-22 PROCEDURE — 3074F SYST BP LT 130 MM HG: CPT | Mod: CPTII,S$GLB,, | Performed by: INTERNAL MEDICINE

## 2022-11-22 PROCEDURE — 99396 PREV VISIT EST AGE 40-64: CPT | Mod: 25,S$GLB,, | Performed by: INTERNAL MEDICINE

## 2022-11-22 PROCEDURE — 3074F PR MOST RECENT SYSTOLIC BLOOD PRESSURE < 130 MM HG: ICD-10-PCS | Mod: CPTII,S$GLB,, | Performed by: INTERNAL MEDICINE

## 2022-11-22 PROCEDURE — 1159F MED LIST DOCD IN RCRD: CPT | Mod: CPTII,S$GLB,, | Performed by: INTERNAL MEDICINE

## 2022-11-22 PROCEDURE — 99999 PR PBB SHADOW E&M-EST. PATIENT-LVL III: CPT | Mod: PBBFAC,,, | Performed by: INTERNAL MEDICINE

## 2022-11-22 PROCEDURE — 99999 PR PBB SHADOW E&M-EST. PATIENT-LVL III: ICD-10-PCS | Mod: PBBFAC,,, | Performed by: INTERNAL MEDICINE

## 2022-11-22 PROCEDURE — 90686 FLU VACCINE (QUAD) GREATER THAN OR EQUAL TO 3YO PRESERVATIVE FREE IM: ICD-10-PCS | Mod: S$GLB,,, | Performed by: INTERNAL MEDICINE

## 2022-11-22 RX ORDER — ESCITALOPRAM OXALATE 10 MG/1
10 TABLET ORAL DAILY
Qty: 90 TABLET | Refills: 3 | Status: SHIPPED | OUTPATIENT
Start: 2022-11-22 | End: 2023-11-20 | Stop reason: SDUPTHER

## 2022-11-22 NOTE — ASSESSMENT & PLAN NOTE
Not on meds.   The 10-year ASCVD risk score (Oscar AN, et al., 2019) is: 0.6%    Values used to calculate the score:      Age: 47 years      Sex: Female      Is Non- : No      Diabetic: No      Tobacco smoker: No      Systolic Blood Pressure: 122 mmHg      Is BP treated: No      HDL Cholesterol: 71 mg/dL      Total Cholesterol: 224 mg/dL

## 2022-11-22 NOTE — ASSESSMENT & PLAN NOTE
Has not been exercising.  Has been cooking more but does lots of pasta/rice.  Has been doing better when cutting down on snacks.

## 2022-11-22 NOTE — PROGRESS NOTES
[facility]    Chief Complaint      Chief Complaint   Patient presents with    Annual Exam     History of Present Illness      Maureen Gordillo is a 47 y.o. female who presents today for Annual preventative visit.  Patient comes to appointment alone.  GYN: Sara    Problem List Items Addressed This Visit       Anxiety and depression    Current Assessment & Plan     Has been off of lexapro for the last 2-3 weeks, anxiety has been creeping back up. No SI/HI/panic attacks.   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      Anxiety was worse on Wellbutrin. Previously on Effxor, felt tired on higher doses in the past.         Relevant Medications    EScitalopram oxalate (LEXAPRO) 10 MG tablet    Mixed hyperlipidemia    Current Assessment & Plan     Not on meds.   The 10-year ASCVD risk score (Oscar AN, et al., 2019) is: 0.6%    Values used to calculate the score:      Age: 47 years      Sex: Female      Is Non- : No      Diabetic: No      Tobacco smoker: No      Systolic Blood Pressure: 122 mmHg      Is BP treated: No      HDL Cholesterol: 71 mg/dL      Total Cholesterol: 224 mg/dL           Class 1 obesity without serious comorbidity with body mass index (BMI) of 32.0 to 32.9 in adult    Current Assessment & Plan     Has not been exercising.  Has been cooking more but does lots of pasta/rice.  Has been doing better when cutting down on snacks.          Other Visit Diagnoses       Annual physical exam    -  Primary    Relevant Orders    CBC Auto Differential    Lipid Panel    Comprehensive Metabolic Panel    Hemoglobin A1C    TSH    Need for influenza vaccination        Relevant Orders    Influenza - Quadrivalent *Preferred* (6 months+) (PF)    Screening for malignant neoplasm of colon        Relevant Orders    Case Request Endoscopy: COLONOSCOPY (Completed)            Health Maintenance   Topic Date Due    Mammogram  05/30/2023     Lipid Panel  2026    TETANUS VACCINE  2028    Hepatitis C Screening  Completed       Past Medical History:   Diagnosis Date    Anxiety     Depression     Hx of hysterectomy 2018       Past Surgical History:   Procedure Laterality Date     SECTION      HYSTERECTOMY  2015    supracervical       family history includes Heart disease in her mother; Hypertension in her father and mother.    Social History     Tobacco Use    Smoking status: Never    Smokeless tobacco: Never   Substance Use Topics    Alcohol use: Yes     Alcohol/week: 3.0 standard drinks     Types: 3 Glasses of wine per week     Comment: daily    Drug use: No       Review of Systems   Constitutional:  Negative for chills and fever.   HENT:  Negative for sore throat.    Respiratory:  Negative for cough and shortness of breath.    Cardiovascular:  Negative for chest pain and palpitations.   Gastrointestinal:  Negative for constipation, diarrhea, nausea and vomiting.   Genitourinary:  Negative for dysuria and hematuria.   Musculoskeletal:  Negative for falls.   Neurological:  Negative for headaches.      Outpatient Encounter Medications as of 2022   Medication Sig Dispense Refill    [DISCONTINUED] EScitalopram oxalate (LEXAPRO) 10 MG tablet Take 1 tablet (10 mg total) by mouth once daily. Take 1/2 tablet daily for the first week then increase whole tablet. (Patient taking differently: Take 10 mg by mouth once daily.) 90 tablet 3    EScitalopram oxalate (LEXAPRO) 10 MG tablet Take 1 tablet (10 mg total) by mouth once daily. 90 tablet 3    [DISCONTINUED] minocycline 4 % Foam Apply 1 application topically once daily. (Patient not taking: Reported on 2022) 30 g 5     No facility-administered encounter medications on file as of 2022.        Review of patient's allergies indicates:  No Known Allergies    Physical Exam      Vital Signs  Temp: 97.9 °F (36.6 °C)  Pulse: 79  SpO2: 99 %  BP: 122/82  Pain Score: 0-No  "pain  Height and Weight  Height: 5' 1" (154.9 cm)  Weight: 77.2 kg (170 lb 3.1 oz)  BSA (Calculated - sq m): 1.82 sq meters  BMI (Calculated): 32.2  Weight in (lb) to have BMI = 25: 132]    Physical Exam  Constitutional:       Appearance: She is well-developed.   HENT:      Head: Normocephalic and atraumatic.      Right Ear: External ear normal.      Left Ear: External ear normal.   Eyes:      General:         Right eye: No discharge.         Left eye: No discharge.   Cardiovascular:      Rate and Rhythm: Normal rate and regular rhythm.      Heart sounds: Normal heart sounds. No murmur heard.  Pulmonary:      Effort: Pulmonary effort is normal. No respiratory distress.      Breath sounds: Normal breath sounds.   Abdominal:      General: There is no distension.      Palpations: Abdomen is soft.      Tenderness: There is no abdominal tenderness. There is no guarding.   Musculoskeletal:         General: Normal range of motion.      Cervical back: Normal range of motion.   Skin:     General: Skin is warm and dry.   Neurological:      Mental Status: She is alert and oriented to person, place, and time.   Psychiatric:         Behavior: Behavior normal.        Laboratory:  CBC:  No results for input(s): WBC, RBC, HGB, HCT, PLT, MCV, MCH, MCHC in the last 2160 hours.  CMP:  No results for input(s): GLU, CALCIUM, ALBUMIN, PROT, NA, K, CO2, CL, BUN, ALKPHOS, ALT, AST, BILITOT in the last 2160 hours.    Invalid input(s): CREATININ  URINALYSIS:  No results for input(s): COLORU, CLARITYU, SPECGRAV, PHUR, PROTEINUA, GLUCOSEU, BILIRUBINCON, BLOODU, WBCU, RBCU, BACTERIA, MUCUS, NITRITE, LEUKOCYTESUR, UROBILINOGEN, HYALINECASTS in the last 2160 hours.   LIPIDS:  No results for input(s): TSH, HDL, CHOL, TRIG, LDLCALC, CHOLHDL, NONHDLCHOL, TOTALCHOLEST in the last 2160 hours.  TSH:  No results for input(s): TSH in the last 2160 hours.  A1C:  No results for input(s): HGBA1C in the last 2160 hours.    Radiology:  No results found in the " last 30 days.     Assessment/Plan     Maureen Gordillo is a 47 y.o.female with:    1. Annual physical exam  - CBC Auto Differential; Future  - Lipid Panel; Future  - Comprehensive Metabolic Panel; Future  - Hemoglobin A1C; Future  - TSH; Future    2. Anxiety and depression  - EScitalopram oxalate (LEXAPRO) 10 MG tablet; Take 1 tablet (10 mg total) by mouth once daily.  Dispense: 90 tablet; Refill: 3    3. Mixed hyperlipidemia    4. Class 1 obesity due to excess calories without serious comorbidity with body mass index (BMI) of 32.0 to 32.9 in adult    5. Need for influenza vaccination  - Influenza - Quadrivalent *Preferred* (6 months+) (PF)    6. Screening for malignant neoplasm of colon  - Case Request Endoscopy: COLONOSCOPY    -labs ordered   -referred for Colonoscopy  -flu shot today, counseled bivalent booster  -Continue current medications and maintain follow up with specialists.    -Follow up for Annual Visit.       Luna Arechiga MD  [facility]

## 2022-11-22 NOTE — ASSESSMENT & PLAN NOTE
Has been off of lexapro for the last 2-3 weeks, anxiety has been creeping back up. No SI/HI/panic attacks.   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      Anxiety was worse on Wellbutrin. Previously on Effxor, felt tired on higher doses in the past.

## 2022-11-28 ENCOUNTER — PATIENT MESSAGE (OUTPATIENT)
Dept: INTERNAL MEDICINE | Facility: CLINIC | Age: 47
End: 2022-11-28
Payer: COMMERCIAL

## 2022-12-01 ENCOUNTER — TELEPHONE (OUTPATIENT)
Dept: GASTROENTEROLOGY | Facility: CLINIC | Age: 47
End: 2022-12-01
Payer: COMMERCIAL

## 2022-12-01 NOTE — LETTER
December 1, 2022    Maureendean LouisDuy  [address] [place] [address]             [facility]  [address], [place] [address]  Phone: [phone]  Fax: [phone] Dear Ms. Gordillo:    We have attempted to contact you to schedule a screening colonoscopy that was ordered by your doctor. Please contact the office to schedule at [phone].      If you have any questions or concerns, please don't hesitate to call.    Sincerely,        Rafaela Portillo MD

## 2022-12-27 DIAGNOSIS — F32.A ANXIETY AND DEPRESSION: ICD-10-CM

## 2022-12-27 DIAGNOSIS — F41.9 ANXIETY AND DEPRESSION: ICD-10-CM

## 2022-12-27 RX ORDER — ESCITALOPRAM OXALATE 10 MG/1
10 TABLET ORAL DAILY
Qty: 90 TABLET | Refills: 3 | Status: CANCELLED | OUTPATIENT
Start: 2022-12-27

## 2022-12-27 NOTE — TELEPHONE ENCOUNTER
No new care gaps identified.  [facility] Embedded Care Gaps. Reference number: [id number]. 12/27/2022   10:49:11 AM CST

## 2022-12-27 NOTE — TELEPHONE ENCOUNTER
# 90 with 3 sent on E-Prescribing Status: Receipt confirmed by pharmacy (11/22/2022 10:11 AM CST)    Lmovm to call pharmacy

## 2023-04-13 ENCOUNTER — PATIENT MESSAGE (OUTPATIENT)
Dept: PRIMARY CARE CLINIC | Facility: CLINIC | Age: 48
End: 2023-04-13
Payer: COMMERCIAL

## 2023-04-13 NOTE — LETTER
April 13, 2023    Maureen Gordillo  [address]  [place] [address]             [facility]  [address]  [place] [address] Dear Ms. Gordillo:    We have attempted to contact you to schedule a screening colonoscopy that was ordered by your doctor. Please contact the office to schedule at [phone].       If you have any questions or concerns, please don't hesitate to call.    Sincerely,        Luna Arechiga MD

## 2023-04-21 ENCOUNTER — PATIENT MESSAGE (OUTPATIENT)
Dept: ADMINISTRATIVE | Facility: HOSPITAL | Age: 48
End: 2023-04-21
Payer: COMMERCIAL

## 2023-05-05 ENCOUNTER — PATIENT MESSAGE (OUTPATIENT)
Dept: ADMINISTRATIVE | Facility: HOSPITAL | Age: 48
End: 2023-05-05
Payer: COMMERCIAL

## 2023-05-05 ENCOUNTER — PATIENT OUTREACH (OUTPATIENT)
Dept: ADMINISTRATIVE | Facility: HOSPITAL | Age: 48
End: 2023-05-05
Payer: COMMERCIAL

## 2023-05-05 NOTE — PROGRESS NOTES
Health Maintenance Due   Topic Date Due    COVID-19 Vaccine (4 - Booster for Pfizer series) 03/12/2022    Colorectal Cancer Screening  Never done    Mammogram  05/30/2023     Chart review done. HM updated. Immunizations reviewed & updated. Care Everywhere updated.  Portal Message sent re:  Colonoscopy

## 2023-05-09 ENCOUNTER — TELEPHONE (OUTPATIENT)
Dept: GASTROENTEROLOGY | Facility: CLINIC | Age: 48
End: 2023-05-09
Payer: COMMERCIAL

## 2023-05-09 ENCOUNTER — PATIENT MESSAGE (OUTPATIENT)
Dept: GASTROENTEROLOGY | Facility: CLINIC | Age: 48
End: 2023-05-09
Payer: COMMERCIAL

## 2023-05-09 RX ORDER — SODIUM, POTASSIUM,MAG SULFATES 17.5-3.13G
1 SOLUTION, RECONSTITUTED, ORAL ORAL DAILY
Qty: 1 KIT | Refills: 0 | Status: SHIPPED | OUTPATIENT
Start: 2023-05-09 | End: 2023-05-11

## 2023-05-09 NOTE — TELEPHONE ENCOUNTER
----- Message from Radha De La Torre sent at 5/9/2023 11:11 AM CDT -----  Regarding: Call back  Contact: [phone]  Type:  Patient Returning Call    Who Called: PT   Who Left Message for Patient: Nurse   Does the patient know what this is regarding?: Yes   Would the patient rather a call back or a response via [facility]? Call back   Best Call Back Number: [phone]  Additional Information:

## 2023-05-09 NOTE — TELEPHONE ENCOUNTER
Referring Physician: Dr. Luna Arechiga                             Date: 5/9/2023    Reason for Referral: Screening colonoscopy      Family History of:   Colon polyp: No  Relationship/Age of Onset:       Colon cancer: No  Relationship/Age of Onset:       Patient with:   Hemoccults Done:       Iron deficient:   No      On Blood Thinner: No      Valvular heart disease/valve replacement: No      Anemia Present: No      On NSAID: No    On Adipex or phentermine:No     On Ozempic: No     Lung disease: No      Kidney disease: No     Hx of Crohn's or Ulcerative colitis:No     Hx of polyps:       Hx of colon cancer:       Previous colon evalations: First colonoscopy  When:   Where:   Pertinent symptoms:           Review of patient's allergies indicates: NKDA        Patient was scheduled for colonoscopy on 6/20/2023       with Dr. Portillo at [facility].       instructions were reviewed with patient.         Prep sent to  [facility] SUPREP Instructions    You are scheduled for a colonoscopy with Dr. Portillo on 6/20/2023 at [facility]. Enter through the [facility] Entrance and check in at [facility] Day Surgery.  To ensure that your test is accurate and complete, you MUST follow these instructions listed below.  If you have any questions, please call our office at [phone].  Plan on being at the hospital for your procedure for 3-4 hours.      IF YOU HAVE ANY QUESTIONS ABOUT YOUR ARRIVAL TIME YOU CAN CALL [phone].    1.  Follow a CLEAR LIQUID DIET for the entire day before your scheduled colonoscopy.  This means no solid food the entire day starting when you wake.  You may have as much of the clear liquids as you want throughout the day.   CLEAR LIQUID DIET:      - NO DAIRY   - You can have:  Coffee with sugar (no creamer), tea, water, soda, apple or white grape juice, chicken or beef broth/bouillon (no meat, noodles, or veggies),  popsicles, , lemonade.    2.  AT 5 pm the evening before your colonoscopy,  POUR ONE (1) BOTTLE OF SUPREP INTO THE MIXING CONTAINER, PROVIDED INSIDE THE BOX.  ADD WATER TO THE LINE ON THE CONTAINER AND MIX IT WELL.  DRINK THE ENTIRE CONTAINER AND THEN DRINK TWO (2) MORE CONTAINERS OF WATER OVER THE NEXT 1 HOUR.  This is sometimes easier to drink if this solution is cold, so you can mix the solution 20 minutes ahead of time and place in the refrigerator prior to drinking.  You have to drink the solution within 30-45 minutes of mixing it.  Do NOT put this solution over ice.  It IS ok to drink with a straw.    3.  The endoscopy department will call you 1 day before your colonoscopy to tell you the exact time to arrive, AND to tell you the exact time to drink the 2nd portion of your prep (which will be FIVE HOURS BEFORE YOUR ARRIVAL TIME).  At this time given to you, POUR ONE (1) BOTTLE OF SUPREP INTO THE MIXING CONTAINER, PROVIDED INSIDE THE BOX.  ADD WATER TO THE LINE ON THE CONTAINER AND MIX IT WELL.  DRINK THE ENTIRE CONTAINER AND THEN DRINK TWO (2) MORE CONTAINERS OF WATER OVER THE NEXT 1 HOUR.  This is sometimes easier to drink if this solution is cold, so you can mix the solution 20 minutes ahead of time and place in the refrigerator prior to drinking.  You have to drink the solution within 30-45 minutes of mixing it.  Do NOT put this solution over ice.  It IS ok to drink with a straw.  Once this is complete, you may not have ANYTHING else by mouth!    4.  You must have someone with you to DRIVE YOU HOME since you will be receiving IV sedation for the colonoscopy.    5.  It is ok to take MOST of your REGULAR MEDICATIONS  in the morning of your test with a SIP of water.  THE ONLY MEDS YOU NEED TO HOLD ARE YOUR DIABETES MEDICATIONS,  SOME BLOOD PRESSURE MEDS, AND BLOOD THINNERS IF OK'D BY YOUR DOCTOR.  Do NOT have anything else to eat or drink the morning of your colonoscopy.  It is ok to brush your teeth.    6.  If you are on blood thinners THAT YOU HAVE BEEN INSTRUCTED TO HOLD BY YOUR  DOCTOR FOR THIS PROCEDURE, then do NOT take this the morning of your colonoscopy.  Do NOT stop these medications on your own, they must be approved to be held by your doctor.  Your colonoscopy can NOT be done if you are on these medications.  Examples of blood thinners include: Coumadin, Aggrenox, Plavix, Pradaxa, Reapro, Pletal, Xarelto, Ticagrelor, Brilinta, Eliquis, and high dose aspirin (325 mg).  You do not have to stop baby aspirin 81 mg.    7.  IF YOU ARE DIABETIC:  NO INSULIN OR ORAL MEDICATIONS THE MORNING OF THE COLONOSCOPY.  TAKE ONLY HALF THE DOSE OF YOUR INSULIN THE DAY BEFORE THE COLONOSCOPY.  DO NOT TAKE ANY ORAL DIABETIC MEDICATIONS THE DAY BEFORE THE COLONOSCOPY.  IF YOU ARE AN INSULIN DEPENDENT DIABETIC WITH UNSTABLE BLOOD SUGARS, NOTIFY YOUR PRIMARY CARE PHYSICIAN FOR INSTRUCTIONS.

## 2023-06-19 ENCOUNTER — PATIENT MESSAGE (OUTPATIENT)
Dept: PRIMARY CARE CLINIC | Facility: CLINIC | Age: 48
End: 2023-06-19
Payer: COMMERCIAL

## 2023-09-10 ENCOUNTER — PATIENT MESSAGE (OUTPATIENT)
Dept: DERMATOLOGY | Facility: CLINIC | Age: 48
End: 2023-09-10
Payer: COMMERCIAL

## 2023-09-13 ENCOUNTER — OFFICE VISIT (OUTPATIENT)
Dept: DERMATOLOGY | Facility: CLINIC | Age: 48
End: 2023-09-13
Payer: COMMERCIAL

## 2023-09-13 DIAGNOSIS — L71.9 ROSACEA: Primary | ICD-10-CM

## 2023-09-13 PROCEDURE — 99214 PR OFFICE/OUTPT VISIT, EST, LEVL IV, 30-39 MIN: ICD-10-PCS | Mod: 95,,, | Performed by: DERMATOLOGY

## 2023-09-13 PROCEDURE — 99214 OFFICE O/P EST MOD 30 MIN: CPT | Mod: 95,,, | Performed by: DERMATOLOGY

## 2023-09-13 RX ORDER — SULFACETAMIDE SODIUM, SULFUR 100; 50 MG/G; MG/G
EMULSION TOPICAL
Qty: 170 G | Refills: 6 | Status: SHIPPED | OUTPATIENT
Start: 2023-09-13

## 2023-09-13 RX ORDER — METRONIDAZOLE 7.5 MG/G
GEL TOPICAL
Qty: 60 G | Refills: 6 | Status: SHIPPED | OUTPATIENT
Start: 2023-09-13

## 2023-09-13 RX ORDER — DOXYCYCLINE 100 MG/1
CAPSULE ORAL
Qty: 30 CAPSULE | Refills: 3 | Status: SHIPPED | OUTPATIENT
Start: 2023-09-13 | End: 2023-11-20

## 2023-09-13 NOTE — PROGRESS NOTES
Subjective:       Patient ID:  Maureen Gordillo is a 48 y.o. female who presents for No chief complaint on file.    HPI   Established patient.  Virtual encounter.   F/u for hx of moderate to severe rosacea - papulopustular and ET. Patient has run out and stopped using past medications which control disease and subsequently flaring. Most recently used minocycline foam and SM triple rosacea cream with control. C/o redness, pimples, burning.     Review of Systems   Constitutional:  Negative for malaise.   Skin:  Positive for rash.        Objective:    Physical Exam   Constitutional: She appears well-developed and well-nourished. No distress.   Neurological: She is alert and oriented to person, place, and time. She is not disoriented.   Psychiatric: She has a normal mood and affect.   Skin:   Areas Examined (abnormalities noted in diagram):   Head / Face Inspection Performed              Diagram Legend     Erythematous scaling macule/papule c/w actinic keratosis       Vascular papule c/w angioma      Pigmented verrucoid papule/plaque c/w seborrheic keratosis      Yellow umbilicated papule c/w sebaceous hyperplasia      Irregularly shaped tan macule c/w lentigo     1-2 mm smooth white papules consistent with Milia      Movable subcutaneous cyst with punctum c/w epidermal inclusion cyst      Subcutaneous movable cyst c/w pilar cyst      Firm pink to brown papule c/w dermatofibroma      Pedunculated fleshy papule(s) c/w skin tag(s)      Evenly pigmented macule c/w junctional nevus     Mildly variegated pigmented, slightly irregular-bordered macule c/w mildly atypical nevus      Flesh colored to evenly pigmented papule c/w intradermal nevus       Pink pearly papule/plaque c/w basal cell carcinoma      Erythematous hyperkeratotic cursted plaque c/w SCC      Surgical scar with no sign of skin cancer recurrence      Open and closed comedones      Inflammatory papules and pustules      Verrucoid papule consistent consistent  with wart     Erythematous eczematous patches and plaques     Dystrophic onycholytic nail with subungual debris c/w onychomycosis     Umbilicated papule    Erythematous-base heme-crusted tan verrucoid plaque consistent with inflamed seborrheic keratosis     Erythematous Silvery Scaling Plaque c/w Psoriasis     See annotation    Today 9/2023 2/2023 12/2021      Assessment / Plan:        Rosacea  -     minocycline 4 % Foam; Apply thin layer to entire face once daily.  Dispense: 30 g; Refill: 6  -     doxycycline (VIBRAMYCIN) 100 MG Cap; Take 1 po qday with food and not within 1 hour prior to lying down  Dispense: 30 capsule; Refill: 3  -     sulfacetamide sodium-sulfur 10-5 % (w/w) Clsr; Wash face qday - bid  Dispense: 170 g; Refill: 6  -     metroNIDAZOLE (METROGEL) 0.75 % gel; Apply thin layer to entire face once daily.  Dispense: 60 g; Refill: 6    Resume topicals as above, addition of sulfacleanse.   Also to use PO doxy to expedite improvement and control while resuming topicals.   - Discussed diagnosis, etiology, and treatment options.   - Counseled on potential SE of medication(s) and instructed on use.   - Ferron, gentle skin care otherwise.   - Photo protect.   - Avoid triggers.          Follow up if symptoms worsen or fail to improve.    The patient location is: home  The chief complaint leading to consultation is: rosacea    Visit type: audiovisual    Face to Face time with patient: 10 mins   15 minutes of total time spent on the encounter, which includes face to face time and non-face to face time preparing to see the patient (eg, review of tests), Obtaining and/or reviewing separately obtained history, Documenting clinical information in the electronic or other health record, Independently interpreting results (not separately reported) and communicating results to the patient/family/caregiver, or Care coordination (not separately reported).     Each patient to whom he or she provides medical  services by telemedicine is:  (1) informed of the relationship between the physician and patient and the respective role of any other health care provider with respect to management of the patient; and (2) notified that he or she may decline to receive medical services by telemedicine and may withdraw from such care at any time.

## 2023-11-20 ENCOUNTER — OFFICE VISIT (OUTPATIENT)
Dept: PRIMARY CARE CLINIC | Facility: CLINIC | Age: 48
End: 2023-11-20
Payer: COMMERCIAL

## 2023-11-20 VITALS
WEIGHT: 166 LBS | OXYGEN SATURATION: 100 % | DIASTOLIC BLOOD PRESSURE: 80 MMHG | BODY MASS INDEX: 31.34 KG/M2 | HEART RATE: 84 BPM | SYSTOLIC BLOOD PRESSURE: 122 MMHG | HEIGHT: 61 IN

## 2023-11-20 DIAGNOSIS — Z23 NEED FOR INFLUENZA VACCINATION: ICD-10-CM

## 2023-11-20 DIAGNOSIS — E66.09 CLASS 1 OBESITY DUE TO EXCESS CALORIES WITHOUT SERIOUS COMORBIDITY WITH BODY MASS INDEX (BMI) OF 32.0 TO 32.9 IN ADULT: ICD-10-CM

## 2023-11-20 DIAGNOSIS — R79.89 ELEVATED TSH: ICD-10-CM

## 2023-11-20 DIAGNOSIS — F41.9 ANXIETY AND DEPRESSION: ICD-10-CM

## 2023-11-20 DIAGNOSIS — F32.A ANXIETY AND DEPRESSION: ICD-10-CM

## 2023-11-20 DIAGNOSIS — Z00.00 ANNUAL PHYSICAL EXAM: Primary | ICD-10-CM

## 2023-11-20 DIAGNOSIS — E78.2 MIXED HYPERLIPIDEMIA: ICD-10-CM

## 2023-11-20 PROCEDURE — 90686 IIV4 VACC NO PRSV 0.5 ML IM: CPT | Mod: S$GLB,,, | Performed by: INTERNAL MEDICINE

## 2023-11-20 PROCEDURE — 1159F PR MEDICATION LIST DOCUMENTED IN MEDICAL RECORD: ICD-10-PCS | Mod: CPTII,S$GLB,, | Performed by: INTERNAL MEDICINE

## 2023-11-20 PROCEDURE — 3079F PR MOST RECENT DIASTOLIC BLOOD PRESSURE 80-89 MM HG: ICD-10-PCS | Mod: CPTII,S$GLB,, | Performed by: INTERNAL MEDICINE

## 2023-11-20 PROCEDURE — G0008 ADMIN INFLUENZA VIRUS VAC: HCPCS | Mod: S$GLB,,, | Performed by: INTERNAL MEDICINE

## 2023-11-20 PROCEDURE — 1159F MED LIST DOCD IN RCRD: CPT | Mod: CPTII,S$GLB,, | Performed by: INTERNAL MEDICINE

## 2023-11-20 PROCEDURE — 99999 PR PBB SHADOW E&M-EST. PATIENT-LVL III: ICD-10-PCS | Mod: PBBFAC,,, | Performed by: INTERNAL MEDICINE

## 2023-11-20 PROCEDURE — 3074F SYST BP LT 130 MM HG: CPT | Mod: CPTII,S$GLB,, | Performed by: INTERNAL MEDICINE

## 2023-11-20 PROCEDURE — 90686 FLU VACCINE (QUAD) GREATER THAN OR EQUAL TO 3YO PRESERVATIVE FREE IM: ICD-10-PCS | Mod: S$GLB,,, | Performed by: INTERNAL MEDICINE

## 2023-11-20 PROCEDURE — 3079F DIAST BP 80-89 MM HG: CPT | Mod: CPTII,S$GLB,, | Performed by: INTERNAL MEDICINE

## 2023-11-20 PROCEDURE — G0008 FLU VACCINE (QUAD) GREATER THAN OR EQUAL TO 3YO PRESERVATIVE FREE IM: ICD-10-PCS | Mod: S$GLB,,, | Performed by: INTERNAL MEDICINE

## 2023-11-20 PROCEDURE — 3008F BODY MASS INDEX DOCD: CPT | Mod: CPTII,S$GLB,, | Performed by: INTERNAL MEDICINE

## 2023-11-20 PROCEDURE — 99396 PREV VISIT EST AGE 40-64: CPT | Mod: S$GLB,,, | Performed by: INTERNAL MEDICINE

## 2023-11-20 PROCEDURE — 99396 PR PREVENTIVE VISIT,EST,40-64: ICD-10-PCS | Mod: S$GLB,,, | Performed by: INTERNAL MEDICINE

## 2023-11-20 PROCEDURE — 99999 PR PBB SHADOW E&M-EST. PATIENT-LVL III: CPT | Mod: PBBFAC,,, | Performed by: INTERNAL MEDICINE

## 2023-11-20 PROCEDURE — 3074F PR MOST RECENT SYSTOLIC BLOOD PRESSURE < 130 MM HG: ICD-10-PCS | Mod: CPTII,S$GLB,, | Performed by: INTERNAL MEDICINE

## 2023-11-20 PROCEDURE — 3008F PR BODY MASS INDEX (BMI) DOCUMENTED: ICD-10-PCS | Mod: CPTII,S$GLB,, | Performed by: INTERNAL MEDICINE

## 2023-11-20 RX ORDER — ESCITALOPRAM OXALATE 20 MG/1
20 TABLET ORAL DAILY
Qty: 90 TABLET | Refills: 3 | Status: SHIPPED | OUTPATIENT
Start: 2023-11-20

## 2023-11-20 NOTE — PROGRESS NOTES
[facility] Note    Chief Complaint      Chief Complaint   Patient presents with    Annual Exam     History of Present Illness      Maureen Gordillo is a 48 y.o. female who presents today for Annual preventative visit.  Patient comes to appointment alone.  GYN: Sara    Problem List Items Addressed This Visit       Anxiety and depression    Current Assessment & Plan     Stable on Lexapro 10 mg. Baseline anxiety is high all the time. Not sleeping well at night, can't turn brain off when she wakes up a few hours before she should. No SI/HI/panic attacks.      Anxiety was worse on Wellbutrin. Previously on Effxor, felt tired on higher doses in the past.         Relevant Medications    EScitalopram oxalate (LEXAPRO) 20 MG tablet    Mixed hyperlipidemia    Current Assessment & Plan     Not on meds.   The 10-year ASCVD risk score (Oscar AN, et al., 2019) is: 0.7%    Values used to calculate the score:      Age: 48 years      Sex: Female      Is Non- : No      Diabetic: No      Tobacco smoker: No      Systolic Blood Pressure: 119 mmHg      Is BP treated: No      HDL Cholesterol: 57 mg/dL      Total Cholesterol: 200 mg/dL           Relevant Orders    Hemoglobin A1C    Class 1 obesity without serious comorbidity with body mass index (BMI) of 32.0 to 32.9 in adult    Current Assessment & Plan     Has not been exercising.  Has been cooking more but does lots of pasta/rice.  Has been doing better when cutting down on snacks.         Relevant Orders    Hemoglobin A1C     Other Visit Diagnoses       Annual physical exam    -  Primary    Relevant Orders    CBC Auto Differential    Lipid Panel    Comprehensive Metabolic Panel    Hemoglobin A1C    Elevated TSH        Relevant Orders    T4, FREE    TSH    Need for influenza vaccination        Relevant Orders    Influenza - Quadrivalent *Preferred* (6 months+) (PF)            Health Maintenance   Topic Date Due    Mammogram  07/24/2024     Lipid Panel  2027    TETANUS VACCINE  2028    Colorectal Cancer Screening  2033    Hepatitis C Screening  Completed       Past Medical History:   Diagnosis Date    Anxiety     Depression     Hx of hysterectomy 2018    PONV (postoperative nausea and vomiting)        Past Surgical History:   Procedure Laterality Date     SECTION      x3    COLONOSCOPY N/A 2023    Procedure: COLONOSCOPY;  Surgeon: Rafaela Portillo MD;  Location: [facility];  Service: Endoscopy;  Laterality: N/A;    HYSTERECTOMY  2015    supracervical       family history includes Heart disease in her mother; Hypertension in her father and mother.    Social History     Tobacco Use    Smoking status: Never    Smokeless tobacco: Never   Substance Use Topics    Alcohol use: Yes     Alcohol/week: 3.0 standard drinks of alcohol     Types: 3 Glasses of wine per week     Comment: daily    Drug use: No       Review of Systems   Constitutional:  Negative for chills and fever.   Respiratory:  Negative for cough and shortness of breath.    Cardiovascular:  Negative for chest pain and palpitations.   Gastrointestinal:  Negative for constipation, diarrhea, nausea and vomiting.   Genitourinary:  Negative for dysuria and hematuria.   Musculoskeletal:  Negative for falls.   Neurological:  Negative for headaches.        Outpatient Encounter Medications as of 2023   Medication Sig Dispense Refill    cetirizine (ZYRTEC) 10 MG tablet Take 10 mg by mouth as needed for Allergies.      ibuprofen (ADVIL,MOTRIN) 200 MG tablet Take 400 mg by mouth as needed for Pain.      metroNIDAZOLE (METROGEL) 0.75 % gel Apply thin layer to entire face once daily. 60 g 6    minocycline 4 % Foam Apply thin layer to entire face once daily. 30 g 6    sulfacetamide sodium-sulfur 10-5 % (w/w) Clsr Wash face qday - bid 170 g 6    [DISCONTINUED] EScitalopram oxalate (LEXAPRO) 10 MG tablet Take 1 tablet (10 mg total) by mouth once daily. (Patient taking  "differently: Take 10 mg by mouth once daily. Takes in afternoon) 90 tablet 3    EScitalopram oxalate (LEXAPRO) 20 MG tablet Take 1 tablet (20 mg total) by mouth once daily. 90 tablet 3    [DISCONTINUED] doxycycline (VIBRAMYCIN) 100 MG Cap Take 1 capsule by mouth daily with food and not within 1 hour prior to lying down (Patient not taking: Reported on 11/20/2023) 30 capsule 3     No facility-administered encounter medications on file as of 11/20/2023.        Review of patient's allergies indicates:  No Known Allergies    Physical Exam      Vital Signs  Pulse: 84  SpO2: 100 %  BP: 122/80  Pain Score: 0-No pain  Height and Weight  Height: 5' 1" (154.9 cm)  Weight: 75.3 kg (166 lb 0.1 oz)  BSA (Calculated - sq m): 1.8 sq meters  BMI (Calculated): 31.4  Weight in (lb) to have BMI = 25: 132]    Physical Exam  Constitutional:       Appearance: She is well-developed.   HENT:      Head: Normocephalic and atraumatic.   Cardiovascular:      Rate and Rhythm: Normal rate and regular rhythm.      Heart sounds: Normal heart sounds. No murmur heard.  Pulmonary:      Effort: Pulmonary effort is normal. No respiratory distress.      Breath sounds: Normal breath sounds.   Abdominal:      General: There is no distension.      Palpations: Abdomen is soft.      Tenderness: There is no abdominal tenderness. There is no guarding.   Skin:     General: Skin is warm and dry.   Neurological:      Mental Status: She is alert. Mental status is at baseline.   Psychiatric:         Behavior: Behavior normal.          Laboratory:  CBC:  No results for input(s): "WBC", "RBC", "HGB", "HCT", "PLT", "MCV", "MCH", "MCHC" in the last 2160 hours.  CMP:  No results for input(s): "GLU", "CALCIUM", "ALBUMIN", "PROT", "NA", "K", "CO2", "CL", "BUN", "ALKPHOS", "ALT", "AST", "BILITOT" in the last 2160 hours.    Invalid input(s): "CREATININ"  URINALYSIS:  No results for input(s): "COLORU", "CLARITYU", "SPECGRAV", "PHUR", "PROTEINUA", "GLUCOSEU", "BILIRUBINCON", " ""BLOODU", "WBCU", "RBCU", "BACTERIA", "MUCUS", "NITRITE", "LEUKOCYTESUR", "UROBILINOGEN", "HYALINECASTS" in the last 2160 hours.   LIPIDS:  No results for input(s): "TSH", "HDL", "CHOL", "TRIG", "LDLCALC", "CHOLHDL", "NONHDLCHOL", "TOTALCHOLEST" in the last 2160 hours.  TSH:  No results for input(s): "TSH" in the last 2160 hours.  A1C:  No results for input(s): "HGBA1C" in the last 2160 hours.    Radiology:  No results found in the last 30 days.     Assessment/Plan     Maureen Gordillo is a 48 y.o.female with:    1. Annual physical exam  - CBC Auto Differential; Future  - Lipid Panel; Future  - Comprehensive Metabolic Panel; Future  - Hemoglobin A1C; Future    2. Elevated TSH  - T4, FREE; Future  - TSH; Future    3. Anxiety and depression  - EScitalopram oxalate (LEXAPRO) 20 MG tablet; Take 1 tablet (20 mg total) by mouth once daily.  Dispense: 90 tablet; Refill: 3    4. Mixed hyperlipidemia  - Hemoglobin A1C; Future    5. Class 1 obesity due to excess calories without serious comorbidity with body mass index (BMI) of 32.0 to 32.9 in adult  - Hemoglobin A1C; Future    6. Need for influenza vaccination  - Influenza - Quadrivalent *Preferred* (6 months+) (PF)      -labs ordered   -flu shot today, counseled COVID booster  -Continue current medications and maintain follow up with specialists.    -Follow up in about 1 year (around 11/20/2024) for Annual Visit.       Luna Arechiga MD  [facility]"

## 2023-11-20 NOTE — ASSESSMENT & PLAN NOTE
Not on meds.   The 10-year ASCVD risk score (Oscar AN, et al., 2019) is: 0.7%    Values used to calculate the score:      Age: 48 years      Sex: Female      Is Non- : No      Diabetic: No      Tobacco smoker: No      Systolic Blood Pressure: 119 mmHg      Is BP treated: No      HDL Cholesterol: 57 mg/dL      Total Cholesterol: 200 mg/dL

## 2023-11-20 NOTE — ASSESSMENT & PLAN NOTE
Stable on Lexapro 10 mg. Baseline anxiety is high all the time. Not sleeping well at night, can't turn brain off when she wakes up a few hours before she should. No SI/HI/panic attacks.      Anxiety was worse on Wellbutrin. Previously on Effxor, felt tired on higher doses in the past.

## 2023-12-13 ENCOUNTER — PATIENT MESSAGE (OUTPATIENT)
Dept: PRIMARY CARE CLINIC | Facility: CLINIC | Age: 48
End: 2023-12-13
Payer: COMMERCIAL

## 2023-12-15 ENCOUNTER — OFFICE VISIT (OUTPATIENT)
Dept: PRIMARY CARE CLINIC | Facility: CLINIC | Age: 48
End: 2023-12-15
Payer: COMMERCIAL

## 2023-12-15 DIAGNOSIS — F41.9 ANXIETY AND DEPRESSION: Primary | ICD-10-CM

## 2023-12-15 DIAGNOSIS — F32.A ANXIETY AND DEPRESSION: Primary | ICD-10-CM

## 2023-12-15 PROCEDURE — 99214 OFFICE O/P EST MOD 30 MIN: CPT | Mod: 95,,, | Performed by: INTERNAL MEDICINE

## 2023-12-15 PROCEDURE — 1160F RVW MEDS BY RX/DR IN RCRD: CPT | Mod: CPTII,95,, | Performed by: INTERNAL MEDICINE

## 2023-12-15 PROCEDURE — 99214 PR OFFICE/OUTPT VISIT, EST, LEVL IV, 30-39 MIN: ICD-10-PCS | Mod: 95,,, | Performed by: INTERNAL MEDICINE

## 2023-12-15 PROCEDURE — 1160F PR REVIEW ALL MEDS BY PRESCRIBER/CLIN PHARMACIST DOCUMENTED: ICD-10-PCS | Mod: CPTII,95,, | Performed by: INTERNAL MEDICINE

## 2023-12-15 PROCEDURE — 1159F PR MEDICATION LIST DOCUMENTED IN MEDICAL RECORD: ICD-10-PCS | Mod: CPTII,95,, | Performed by: INTERNAL MEDICINE

## 2023-12-15 PROCEDURE — 1159F MED LIST DOCD IN RCRD: CPT | Mod: CPTII,95,, | Performed by: INTERNAL MEDICINE

## 2023-12-15 RX ORDER — BUSPIRONE HYDROCHLORIDE 10 MG/1
10 TABLET ORAL 3 TIMES DAILY
Qty: 90 TABLET | Refills: 0 | Status: SHIPPED | OUTPATIENT
Start: 2023-12-15 | End: 2024-01-18 | Stop reason: SDUPTHER

## 2023-12-15 NOTE — ASSESSMENT & PLAN NOTE
Lexapro increased to 20 mg daily last visit. Baseline anxiety is high all the time. Not sleeping well at night, can't turn brain off when she wakes up a few hours before she should. No SI/HI/panic attacks.      Anxiety was worse on Wellbutrin. Previously on Effxor, felt tired on higher doses in the past.

## 2023-12-15 NOTE — PROGRESS NOTES
The patient location is: home  The chief complaint leading to consultation is: anxiety    Visit type: audiovisual    Face to Face time with patient: 10 minutes  10 minutes of total time spent on the encounter, which includes face to face time and non-face to face time preparing to see the patient (eg, review of tests), Obtaining and/or reviewing separately obtained history, Documenting clinical information in the electronic or other health record, Independently interpreting results (not separately reported) and communicating results to the patient/family/caregiver, or Care coordination (not separately reported).         Each patient to whom he or she provides medical services by telemedicine is:  (1) informed of the relationship between the physician and patient and the respective role of any other health care provider with respect to management of the patient; and (2) notified that he or she may decline to receive medical services by telemedicine and may withdraw from such care at any time.    [facility] Note    Chief Complaint      Chief Complaint   Patient presents with    Anxiety    Depression     History of Present Illness      Maureen Gordillo is a 48 y.o. female who presents today for anxiety.  Patient comes to appointment alone.  GYN: Dietrich    Problem List Items Addressed This Visit       Anxiety and depression - Primary    Current Assessment & Plan     Lexapro increased to 20 mg daily last visit. Baseline anxiety is high all the time. Not sleeping well at night, can't turn brain off when she wakes up a few hours before she should. No SI/HI/panic attacks.      Anxiety was worse on Wellbutrin. Previously on Effxor, felt tired on higher doses in the past.              Health Maintenance   Topic Date Due    Mammogram  07/24/2024    Lipid Panel  11/23/2027    TETANUS VACCINE  12/31/2028    Colorectal Cancer Screening  06/20/2033    Hepatitis C Screening  Completed       Past Medical History:    Diagnosis Date    Anxiety     Depression     Hx of hysterectomy 2018    PONV (postoperative nausea and vomiting)        Past Surgical History:   Procedure Laterality Date     SECTION      x3    COLONOSCOPY N/A 2023    Procedure: COLONOSCOPY;  Surgeon: Rafaela Portillo MD;  Location: [facility];  Service: Endoscopy;  Laterality: N/A;    HYSTERECTOMY  2015    supracervical       family history includes Heart disease in her mother; Hypertension in her father and mother.    Social History     Tobacco Use    Smoking status: Never    Smokeless tobacco: Never   Substance Use Topics    Alcohol use: Yes     Alcohol/week: 3.0 standard drinks of alcohol     Types: 3 Glasses of wine per week     Comment: daily    Drug use: No       Review of Systems   Constitutional:  Negative for chills and fever.   HENT:  Negative for hearing loss.    Eyes:  Negative for discharge.   Respiratory:  Negative for cough, shortness of breath and wheezing.    Cardiovascular:  Negative for chest pain and palpitations.   Gastrointestinal:  Negative for blood in stool, constipation, diarrhea, nausea and vomiting.   Genitourinary:  Negative for dysuria and hematuria.   Musculoskeletal:  Negative for falls and neck pain.   Neurological:  Negative for weakness and headaches.   Endo/Heme/Allergies:  Negative for polydipsia.        Outpatient Encounter Medications as of 12/15/2023   Medication Sig Dispense Refill    busPIRone (BUSPAR) 10 MG tablet Take 1 tablet (10 mg total) by mouth 3 (three) times daily. 90 tablet 0    cetirizine (ZYRTEC) 10 MG tablet Take 10 mg by mouth as needed for Allergies.      EScitalopram oxalate (LEXAPRO) 20 MG tablet Take 1 tablet (20 mg total) by mouth once daily. 90 tablet 3    ibuprofen (ADVIL,MOTRIN) 200 MG tablet Take 400 mg by mouth as needed for Pain.      metroNIDAZOLE (METROGEL) 0.75 % gel Apply thin layer to entire face once daily. 60 g 6    minocycline 4 % Foam Apply thin layer to entire  "face once daily. 30 g 6    sulfacetamide sodium-sulfur 10-5 % (w/w) Clsr Wash face qday - bid 170 g 6     No facility-administered encounter medications on file as of 12/15/2023.        Review of patient's allergies indicates:  No Known Allergies    Physical Exam       ]    Physical Exam  Constitutional:       General: She is not in acute distress.     Appearance: She is well-developed.   HENT:      Head: Normocephalic and atraumatic.   Pulmonary:      Effort: Pulmonary effort is normal.   Musculoskeletal:      Cervical back: Normal range of motion.   Skin:     Findings: No rash.   Neurological:      Mental Status: She is alert and oriented to person, place, and time.      Coordination: Coordination normal.   Psychiatric:         Behavior: Behavior normal.         Thought Content: Thought content normal.         Judgment: Judgment normal.          Laboratory:  CBC:  No results for input(s): "WBC", "RBC", "HGB", "HCT", "PLT", "MCV", "MCH", "MCHC" in the last 2160 hours.  CMP:  No results for input(s): "GLU", "CALCIUM", "ALBUMIN", "PROT", "NA", "K", "CO2", "CL", "BUN", "ALKPHOS", "ALT", "AST", "BILITOT" in the last 2160 hours.    Invalid input(s): "CREATININ"  URINALYSIS:  No results for input(s): "COLORU", "CLARITYU", "SPECGRAV", "PHUR", "PROTEINUA", "GLUCOSEU", "BILIRUBINCON", "BLOODU", "WBCU", "RBCU", "BACTERIA", "MUCUS", "NITRITE", "LEUKOCYTESUR", "UROBILINOGEN", "HYALINECASTS" in the last 2160 hours.   LIPIDS:  No results for input(s): "TSH", "HDL", "CHOL", "TRIG", "LDLCALC", "CHOLHDL", "NONHDLCHOL", "TOTALCHOLEST" in the last 2160 hours.  TSH:  No results for input(s): "TSH" in the last 2160 hours.  A1C:  No results for input(s): "HGBA1C" in the last 2160 hours.    Radiology:  No results found in the last 30 days.     Assessment/Plan     Maureen Gordillo is a 48 y.o.female with:    1. Anxiety and depression        -try buspar 10 mg BID, can taper up   -if no response, try swapping SSRI  -Continue current " medications and maintain follow up with specialists.    -No follow-ups on file.       Luna Arechiga MD  [facility]                    Answers submitted by the patient for this visit:  Review of Systems Questionnaire (Submitted on 12/15/2023)  activity change: No  unexpected weight change: No  rhinorrhea: No  trouble swallowing: No  visual disturbance: No  chest tightness: No  polyuria: No  difficulty urinating: No  menstrual problem: No  joint swelling: No  arthralgias: No  confusion: No  dysphoric mood: No

## 2024-01-11 ENCOUNTER — PATIENT MESSAGE (OUTPATIENT)
Dept: PRIMARY CARE CLINIC | Facility: CLINIC | Age: 49
End: 2024-01-11
Payer: COMMERCIAL

## 2024-01-18 RX ORDER — BUSPIRONE HYDROCHLORIDE 10 MG/1
10 TABLET ORAL 3 TIMES DAILY
Qty: 90 TABLET | Refills: 3 | Status: SHIPPED | OUTPATIENT
Start: 2024-01-18 | End: 2025-01-17

## 2024-01-18 NOTE — TELEPHONE ENCOUNTER
No care due was identified.  [facility] Embedded Care Due Messages. Reference number: [id number].   1/18/2024 4:18:34 PM CST

## 2024-03-02 ENCOUNTER — PATIENT MESSAGE (OUTPATIENT)
Dept: PRIMARY CARE CLINIC | Facility: CLINIC | Age: 49
End: 2024-03-02
Payer: COMMERCIAL

## 2024-04-19 ENCOUNTER — PATIENT MESSAGE (OUTPATIENT)
Dept: PRIMARY CARE CLINIC | Facility: CLINIC | Age: 49
End: 2024-04-19
Payer: COMMERCIAL

## 2024-05-31 ENCOUNTER — OFFICE VISIT (OUTPATIENT)
Dept: INFECTIOUS DISEASES | Facility: CLINIC | Age: 49
End: 2024-05-31

## 2024-05-31 ENCOUNTER — CLINICAL SUPPORT (OUTPATIENT)
Dept: INFECTIOUS DISEASES | Facility: CLINIC | Age: 49
End: 2024-05-31

## 2024-05-31 VITALS
BODY MASS INDEX: 32.26 KG/M2 | DIASTOLIC BLOOD PRESSURE: 86 MMHG | WEIGHT: 170.88 LBS | SYSTOLIC BLOOD PRESSURE: 132 MMHG | HEIGHT: 61 IN | HEART RATE: 74 BPM | TEMPERATURE: 98 F

## 2024-05-31 DIAGNOSIS — Z23 NEED FOR MENINGOCOCCAL VACCINATION: ICD-10-CM

## 2024-05-31 DIAGNOSIS — Z23 NEED FOR TDAP VACCINATION: ICD-10-CM

## 2024-05-31 DIAGNOSIS — Z71.85 VACCINE COUNSELING: ICD-10-CM

## 2024-05-31 DIAGNOSIS — Z71.84 COUNSELING ABOUT TRAVEL: Primary | ICD-10-CM

## 2024-05-31 DIAGNOSIS — Z29.89 NEED FOR MALARIA PROPHYLAXIS: ICD-10-CM

## 2024-05-31 DIAGNOSIS — Z23 POLIO VACCINE NEEDED: ICD-10-CM

## 2024-05-31 DIAGNOSIS — Z23 NEED FOR IMMUNIZATION AGAINST YELLOW FEVER: ICD-10-CM

## 2024-05-31 DIAGNOSIS — Z23 NEED FOR IMMUNIZATION AGAINST TYPHOID: ICD-10-CM

## 2024-05-31 DIAGNOSIS — Z23 NEED FOR COVID-19 VACCINE: ICD-10-CM

## 2024-05-31 DIAGNOSIS — Z23 NEED FOR HEPATITIS A IMMUNIZATION: ICD-10-CM

## 2024-05-31 DIAGNOSIS — Z23 NEED FOR HEPATITIS B VACCINATION: ICD-10-CM

## 2024-05-31 DIAGNOSIS — A09 TRAVELER'S DIARRHEA: ICD-10-CM

## 2024-05-31 DIAGNOSIS — Z23 NEED FOR IMMUNIZATION AGAINST TYPHOID: Primary | ICD-10-CM

## 2024-05-31 PROCEDURE — 99999 PR PBB SHADOW E&M-EST. PATIENT-LVL I: CPT | Mod: PBBFAC,,,

## 2024-05-31 PROCEDURE — 90471 IMMUNIZATION ADMIN: CPT | Mod: S$GLB,,, | Performed by: INTERNAL MEDICINE

## 2024-05-31 PROCEDURE — 90691 TYPHOID VACCINE IM: CPT | Mod: S$GLB,,, | Performed by: INTERNAL MEDICINE

## 2024-05-31 PROCEDURE — 99403 PREV MED CNSL INDIV APPRX 45: CPT | Mod: S$GLB,,, | Performed by: NURSE PRACTITIONER

## 2024-05-31 PROCEDURE — 99999 PR PBB SHADOW E&M-EST. PATIENT-LVL IV: CPT | Mod: PBBFAC,,, | Performed by: NURSE PRACTITIONER

## 2024-05-31 PROCEDURE — 90472 IMMUNIZATION ADMIN EACH ADD: CPT | Mod: S$GLB,,, | Performed by: INTERNAL MEDICINE

## 2024-05-31 PROCEDURE — 90717 YELLOW FEVER VACCINE SUBQ: CPT | Mod: S$GLB,,, | Performed by: INTERNAL MEDICINE

## 2024-05-31 RX ORDER — AZITHROMYCIN 500 MG/1
500 TABLET, FILM COATED ORAL DAILY
Qty: 3 TABLET | Refills: 0 | Status: SHIPPED | OUTPATIENT
Start: 2024-05-31 | End: 2024-05-31 | Stop reason: ALTCHOICE

## 2024-05-31 RX ORDER — ATOVAQUONE AND PROGUANIL HYDROCHLORIDE 250; 100 MG/1; MG/1
TABLET, FILM COATED ORAL
Qty: 20 TABLET | Refills: 0 | Status: SHIPPED | OUTPATIENT
Start: 2024-05-31

## 2024-05-31 NOTE — PROGRESS NOTES
Subjective:      Patient ID: Maureen Gordillo is a 48 y.o. female.    Chief Complaint:Travel Consult      History of Present Illness    Travel Consult  Chief Complaint   Patient presents with    Travel Consult     Maureen Gordillo is here for travel consultation.  She is traveling to [facility] on 6/26.  Her  works [place] there and she is going to visit.  She has never been out of the country before.  Staying for 8 days.  [place]ing [place] [facility].  Advised she needs yellow fever vaccine.     Areas in country: urban  ? Some rural  Accommodations: resort  Will stay one night in employer's Staff house   Purpose of travel: vacation  Currently ill / Fever: no  History of Splenectomy: no  The patient states that She does not have, or live with a household member,  with cancer, HIV infection, or take drugs to suppress the immune system.  Denies any immunosuppressing medications.     Not currently pregnant.  No possibility of pregnancy   Denies history MS, MG, thymus dysfunction, thymectomy, or autoimmune disease     Reports usual childhood vaccines   UTD on flu.    COVID not up to date  Tdap up to date    Past Medical History:   Diagnosis Date    Anxiety     Depression     Hx of hysterectomy 12/31/2018    PONV (postoperative nausea and vomiting)      Patient has no known allergies.  Immunization History   Administered Date(s) Administered    COVID-19, MRNA, LN-S, PF (Pfizer) (Purple Cap) 04/03/2021, 04/24/2021, 01/15/2022    Influenza 10/01/2018    Influenza - Quadrivalent 09/21/2015, 01/31/2017    Influenza - Quadrivalent - PF *Preferred* (6 months and older) 11/14/2014, 10/01/2018, 12/23/2019, 11/10/2021, 11/22/2022, 11/20/2023    Tdap 12/31/2018    Typhoid - ViCPs 05/31/2024    Yellow Fever 05/31/2024       Review of Systems   Constitutional: Negative for chills, decreased appetite, fever, malaise/fatigue, night sweats, weight gain and weight loss.   HENT:  Negative for congestion, ear pain, hearing loss,  hoarse voice, sore throat and tinnitus.    Eyes:  Negative for blurred vision, redness and visual disturbance.   Cardiovascular:  Negative for chest pain, leg swelling and palpitations.   Respiratory:  Negative for cough, hemoptysis, shortness of breath, sputum production and wheezing.    Hematologic/Lymphatic: Negative for adenopathy. Does not bruise/bleed easily.   Skin:  Negative for dry skin, itching, rash and suspicious lesions.   Musculoskeletal:  Negative for back pain, joint pain, myalgias and neck pain.   Gastrointestinal:  Negative for abdominal pain, constipation, diarrhea, heartburn, nausea and vomiting.   Genitourinary:  Negative for dysuria, flank pain, frequency, hematuria, hesitancy and urgency.   Neurological:  Negative for dizziness, headaches, numbness, paresthesias and weakness.   Psychiatric/Behavioral:  Negative for depression and memory loss. The patient does not have insomnia and is not nervous/anxious.    Allergic/Immunologic: Negative for environmental allergies, HIV exposure, hives and persistent infections.     Objective:   Physical Exam  Constitutional:       General: She is not in acute distress.     Appearance: Normal appearance. She is not ill-appearing.   HENT:      Head: Normocephalic and atraumatic.   Eyes:      General: No scleral icterus.     Conjunctiva/sclera: Conjunctivae normal.   Cardiovascular:      Rate and Rhythm: Normal rate.   Pulmonary:      Effort: Pulmonary effort is normal. No respiratory distress.   Skin:     General: Skin is warm and dry.   Neurological:      Mental Status: She is alert and oriented to person, place, and time.   Psychiatric:         Mood and Affect: Mood normal.         Behavior: Behavior normal.       Assessment:     1. Counseling about travel    2. Vaccine counseling    3. Need for Tdap vaccination    4. Need for hepatitis A immunization    5. Need for hepatitis B vaccination    6. Need for malaria prophylaxis    7. Traveler's diarrhea    8.  Need for meningococcal vaccination    9. Need for COVID-19 vaccine    10. Polio vaccine needed    11. Need for immunization against typhoid    12. Need for immunization against yellow fever        Plan:     The Patient was provided with an extensive travel guidance packet which provides travel information specific to the patients itinerary.   The patient's medical history was reviewed and the patient was counseled on:  Dietary precautions.  Personal protective measures to prevent insect-borne diseases (e.g., malaria, dengue).  Precautions to prevent exposure to rabies and seek treatment for possible exposures.  Precautions against sun exposure.  Precautions against development of DVT during flight.  Personal and travel safety.  The patient's immunization history was reviewed and, based on the patient's itinerary, immunizations were ordered:   Yellow fever, typhoid, Hepatitis A, meningococcal (menveo), polio booster.  Recommend updated COVID vaccine (get at pharmacy.  Not currently available in clinic)  The patient was encouraged to contact us about any problems that may develop after immunization and possible side effects were reviewed.  Specifically reviewed potential adverse effects of yellow fever vaccine, including rare, but serious side effects of yellow fever viserotropic and neurotropic disease and patient wishes to proceed with the vaccine  The patient was instructed to purchase Imodium over the counter to take in case diarrhea (without blood or fever) develops.  An antibiotic was ordered for treatment if severe or bloody diarrhea develops and the patient was instructed on use and possible side effects.    The patient was also instructed to purchase insect repellent containing DEET or Picardin and apply according to repellent label instructions.  If indicated by the patients itinerary an anti-malarial agent was prescribed for malaria prophylaxis and possible side effects were reviewed.  Malarone  prescribed  The patient was instructed to contact us if problems develop after travel.         45 minutes of total time was spent on this encounter, which includes face to face time and non-face to face time preparing to see the patient (eg, review of tests), Obtaining and/or reviewing separately obtained history, documenting clinical information in the electronic or other health record, independently interpreting results (not separately reported) and communicating results to the patient/family/caregiver, or care coordination (not separately reported).

## 2024-05-31 NOTE — PATIENT INSTRUCTIONS
Thank you for your visit today!   Please review travel guidance packet for your specific itinerary prior to your trip.      Vaccines recommended:  Yellow fever, typhoid, hepatitis A (return in 6 months for 2nd dose to complete series which is good for life), meningitis, polio booster, updated COVID.   Consider the hepatitis B (two doses one month or later to complete series which is good for life)      2.  General Insect/mosquito/tick precautions    Avoid insect bites by:  Using insect repellent with DEET or Picaridin.  You can apply insect repellents to clothing and skin (ideally containing 30-50% DEET).  See your Travel Packet.  Note that DEET strengths >35% can damage synthetic materials   Cover up as much as possible with loose-fitting, lightweight clothing.   Wear long sleeved shirts and long pants  Treat clothing and gear with permethrin 0.5%  Choose a lodging with air conditioning, or windows and door screens  Sleep under a mosquito net if you cannot stay in a place with air conditioning, or without screens and if you are staying outdoors    Prevent tick bites by:  Avoiding grassy or wooded areas or animals  Treat clothing and gear with permethrin 0.5%  Use EPA registered insect repellents  Avoid contact with ticks and  perform tick checks and remove them if found    3 Malaria prophylaxis:  - Malarone, take one pill daily.   Start 1-2 days prior to entering affected area, continue daily during travel, and continue for 7 days after leaving affected area      4.  Traveler's diarrhea  - buy hand  and use prior to eating and after using the bathroom  - bring imodium with you (available over the counter). If diarrhea is severe, lasting longer than 4 days or with associated fever or blood in stool, take the antibiotic,  Azithromycin, as we discussed.   -  Dehydration is the most likely complication of traveler's diarrhea, so it's important to try to stay well hydrated.  An oral rehydration salts (ORS)  solution is the best way to replace lost fluids. These solutions contain water and salts in specific proportions to replenish both fluids and electrolytes. They also contain glucose to enhance absorption in the intestinal tract.  Bottled oral rehydration products are available in drugstores in developed areas, and many pharmacies carry their own brands. You can find packets of powdered oral rehydration salts, labeled World Health Organization (WHO)-ORS, at stores, pharmacies and health agencies in most countries. Reconstitute the powder in bottled or boiled water according to the directions on the package.      If these products are unavailable, you can prepare your own rehydrating solution in an emergency by mixing together your own rehydration solution:  Six (6) level teaspoons of Sugar.  Half (1/2) level teaspoon of Salt.  One Litre of clean drinking or boiled water and then cooled - 5 cupfuls (each cup about 200 ml.)         5.    After you travel:  -  Ensure you finish any remaining medication such as antimalarial medications.   -  If you are not feeling well, please contact the ID office immediately to evaluate your symptoms.   -  You may be asked to remember any travel activities, where you stayed (hotel, family home, etc), what your ate and drank, any animal contacts, injuries or bug bites.

## 2024-05-31 NOTE — PROGRESS NOTES
Patient received Typhoid IM to the right deltoid.  And also yellow fever sub Q to right arm w/yellow card documentation.  Tolerated well and left in NAD

## 2024-06-02 RX ORDER — AZITHROMYCIN 500 MG/1
TABLET, FILM COATED ORAL
Qty: 3 TABLET | Refills: 0 | Status: SHIPPED | OUTPATIENT
Start: 2024-06-02

## 2024-06-07 ENCOUNTER — CLINICAL SUPPORT (OUTPATIENT)
Dept: INFECTIOUS DISEASES | Facility: CLINIC | Age: 49
End: 2024-06-07
Payer: COMMERCIAL

## 2024-06-07 DIAGNOSIS — Z00.00 HEALTHCARE MAINTENANCE: Primary | ICD-10-CM

## 2024-06-07 PROCEDURE — 90471 IMMUNIZATION ADMIN: CPT | Mod: S$GLB,,, | Performed by: INTERNAL MEDICINE

## 2024-06-07 PROCEDURE — 99999 PR PBB SHADOW E&M-EST. PATIENT-LVL I: CPT | Mod: PBBFAC,,,

## 2024-06-07 PROCEDURE — 90734 MENACWYD/MENACWYCRM VACC IM: CPT | Mod: S$GLB,,, | Performed by: INTERNAL MEDICINE

## 2024-06-07 PROCEDURE — 90472 IMMUNIZATION ADMIN EACH ADD: CPT | Mod: S$GLB,,, | Performed by: INTERNAL MEDICINE

## 2024-06-07 PROCEDURE — 90632 HEPA VACCINE ADULT IM: CPT | Mod: S$GLB,,, | Performed by: INTERNAL MEDICINE

## 2024-06-07 PROCEDURE — 90713 POLIOVIRUS IPV SC/IM: CPT | Mod: S$GLB,,, | Performed by: INTERNAL MEDICINE

## 2024-06-07 NOTE — PROGRESS NOTES
Patient received 3 vaccines IM to the left deltoid, Polio posterior, Menveo, Hep A anterior.  Tolerated well and left in NAD

## 2024-06-21 ENCOUNTER — TELEPHONE (OUTPATIENT)
Dept: INFECTIOUS DISEASES | Facility: CLINIC | Age: 49
End: 2024-06-21
Payer: COMMERCIAL

## 2024-06-21 ENCOUNTER — PATIENT MESSAGE (OUTPATIENT)
Dept: INFECTIOUS DISEASES | Facility: CLINIC | Age: 49
End: 2024-06-21
Payer: COMMERCIAL

## 2024-06-21 NOTE — TELEPHONE ENCOUNTER
----- Message from ANGELA Garrison, ANP sent at 6/21/2024  7:58 AM CDT -----  Please call patient.  She is leaving on her trip next week.  Does she have all of her malaria medication etc?  ----- Message -----  From: Rylie Cunha APRN, ANP  Sent: 6/20/2024  12:00 AM CDT  To: ANGELA Garrison, ANP    Does she have all her meds?

## 2024-11-14 ENCOUNTER — PATIENT MESSAGE (OUTPATIENT)
Dept: PRIMARY CARE CLINIC | Facility: CLINIC | Age: 49
End: 2024-11-14
Payer: COMMERCIAL

## 2024-11-26 ENCOUNTER — HOSPITAL ENCOUNTER (OUTPATIENT)
Dept: RADIOLOGY | Facility: HOSPITAL | Age: 49
Discharge: HOME | End: 2024-11-26
Attending: INTERNAL MEDICINE
Payer: COMMERCIAL

## 2024-11-26 ENCOUNTER — OFFICE VISIT (OUTPATIENT)
Dept: PRIMARY CARE CLINIC | Facility: CLINIC | Age: 49
End: 2024-11-26
Payer: COMMERCIAL

## 2024-11-26 VITALS
SYSTOLIC BLOOD PRESSURE: 136 MMHG | WEIGHT: 166.25 LBS | OXYGEN SATURATION: 98 % | HEART RATE: 74 BPM | HEIGHT: 61 IN | DIASTOLIC BLOOD PRESSURE: 92 MMHG | BODY MASS INDEX: 31.39 KG/M2

## 2024-11-26 VITALS — WEIGHT: 161 LBS | HEIGHT: 61 IN | BODY MASS INDEX: 30.4 KG/M2

## 2024-11-26 DIAGNOSIS — E66.09 CLASS 1 OBESITY DUE TO EXCESS CALORIES WITHOUT SERIOUS COMORBIDITY WITH BODY MASS INDEX (BMI) OF 31.0 TO 31.9 IN ADULT: ICD-10-CM

## 2024-11-26 DIAGNOSIS — E66.811 CLASS 1 OBESITY DUE TO EXCESS CALORIES WITHOUT SERIOUS COMORBIDITY WITH BODY MASS INDEX (BMI) OF 31.0 TO 31.9 IN ADULT: ICD-10-CM

## 2024-11-26 DIAGNOSIS — Z23 NEED FOR INFLUENZA VACCINATION: ICD-10-CM

## 2024-11-26 DIAGNOSIS — F32.A ANXIETY AND DEPRESSION: ICD-10-CM

## 2024-11-26 DIAGNOSIS — Z12.31 ENCOUNTER FOR SCREENING MAMMOGRAM FOR BREAST CANCER: ICD-10-CM

## 2024-11-26 DIAGNOSIS — E78.2 MIXED HYPERLIPIDEMIA: Primary | ICD-10-CM

## 2024-11-26 DIAGNOSIS — F41.9 ANXIETY AND DEPRESSION: ICD-10-CM

## 2024-11-26 DIAGNOSIS — Z00.00 ANNUAL PHYSICAL EXAM: ICD-10-CM

## 2024-11-26 PROCEDURE — 77067 SCR MAMMO BI INCL CAD: CPT | Mod: TC

## 2024-11-26 PROCEDURE — 3080F DIAST BP >= 90 MM HG: CPT | Mod: CPTII,S$GLB,, | Performed by: INTERNAL MEDICINE

## 2024-11-26 PROCEDURE — 1159F MED LIST DOCD IN RCRD: CPT | Mod: CPTII,S$GLB,, | Performed by: INTERNAL MEDICINE

## 2024-11-26 PROCEDURE — G0008 ADMIN INFLUENZA VIRUS VAC: HCPCS | Mod: S$GLB,,, | Performed by: INTERNAL MEDICINE

## 2024-11-26 PROCEDURE — 3044F HG A1C LEVEL LT 7.0%: CPT | Mod: CPTII,S$GLB,, | Performed by: INTERNAL MEDICINE

## 2024-11-26 PROCEDURE — 90656 IIV3 VACC NO PRSV 0.5 ML IM: CPT | Mod: S$GLB,,, | Performed by: INTERNAL MEDICINE

## 2024-11-26 PROCEDURE — 99396 PREV VISIT EST AGE 40-64: CPT | Mod: S$GLB,,, | Performed by: INTERNAL MEDICINE

## 2024-11-26 PROCEDURE — 3075F SYST BP GE 130 - 139MM HG: CPT | Mod: CPTII,S$GLB,, | Performed by: INTERNAL MEDICINE

## 2024-11-26 PROCEDURE — 3008F BODY MASS INDEX DOCD: CPT | Mod: CPTII,S$GLB,, | Performed by: INTERNAL MEDICINE

## 2024-11-26 PROCEDURE — 99999 PR PBB SHADOW E&M-EST. PATIENT-LVL III: CPT | Mod: PBBFAC,,, | Performed by: INTERNAL MEDICINE

## 2024-11-26 RX ORDER — ESCITALOPRAM OXALATE 20 MG/1
20 TABLET ORAL DAILY
Qty: 90 TABLET | Refills: 3 | Status: SHIPPED | OUTPATIENT
Start: 2024-11-26

## 2024-11-26 RX ORDER — BUSPIRONE HYDROCHLORIDE 10 MG/1
10 TABLET ORAL 3 TIMES DAILY
Qty: 90 TABLET | Refills: 3 | Status: SHIPPED | OUTPATIENT
Start: 2024-11-26 | End: 2025-11-26

## 2024-11-26 NOTE — ASSESSMENT & PLAN NOTE
Stopped taking Lexapro 20 mg and buspar several months ago because she forgot to take. Not sleeping well at night, can't turn brain off when she wakes up a few hours before she should. No SI/HI/panic attacks.      Previous meds:  Anxiety was worse on Wellbutrin. Previously on Effxor, felt tired on higher doses in the past.   Depth Of Biopsy: dermis

## 2024-11-26 NOTE — ASSESSMENT & PLAN NOTE
Not on meds.   The 10-year ASCVD risk score (Oscar AN, et al., 2019) is: 1.1%    Values used to calculate the score:      Age: 49 years      Sex: Female      Is Non- : No      Diabetic: No      Tobacco smoker: No      Systolic Blood Pressure: 136 mmHg      Is BP treated: No      HDL Cholesterol: 63 mg/dL      Total Cholesterol: 234 mg/dL

## 2024-11-26 NOTE — PROGRESS NOTES
[facility] Note    Chief Complaint      Chief Complaint   Patient presents with    Annual Exam     History of Present Illness      Maureen Gordillo is a 49 y.o. female who presents today for Annual preventative visit.  Patient comes to appointment alone.      Problem List Items Addressed This Visit       Anxiety and depression    Current Assessment & Plan     Stopped taking Lexapro 20 mg and buspar several months ago because she forgot to take. Not sleeping well at night, can't turn brain off when she wakes up a few hours before she should. No SI/HI/panic attacks.      Previous meds:  Anxiety was worse on Wellbutrin. Previously on Effxor, felt tired on higher doses in the past.         Relevant Medications    EScitalopram oxalate (LEXAPRO) 20 MG tablet    Other Relevant Orders    TSH    Class 1 obesity without serious comorbidity with body mass index (BMI) of 31.0 to 31.9 in adult    Current Assessment & Plan     Cutting back on sodas.  Hasn't been as hungry as she was in the past.         Relevant Orders    Hemoglobin A1C    Mixed hyperlipidemia - Primary    Current Assessment & Plan     Not on meds.   The 10-year ASCVD risk score (Oscar AN, et al., 2019) is: 1.1%    Values used to calculate the score:      Age: 49 years      Sex: Female      Is Non- : No      Diabetic: No      Tobacco smoker: No      Systolic Blood Pressure: 136 mmHg      Is BP treated: No      HDL Cholesterol: 63 mg/dL      Total Cholesterol: 234 mg/dL            Other Visit Diagnoses       Annual physical exam        Relevant Orders    CBC Auto Differential    Lipid Panel    Comprehensive Metabolic Panel    Need for influenza vaccination        Relevant Medications    influenza (Flulaval, Fluzone, Fluarix) 45 mcg/0.5 mL IM vaccine (> or = 6 mo) 0.5 mL (Start on 11/26/2024  9:00 AM)              Health Maintenance   Topic Date Due    Mammogram  07/24/2024    TETANUS VACCINE  12/31/2028    Lipid Panel   2029    Colorectal Cancer Screening  2033    Hepatitis C Screening  Completed       Past Medical History:   Diagnosis Date    Anxiety     Depression     Hx of hysterectomy 2018    PONV (postoperative nausea and vomiting)        Past Surgical History:   Procedure Laterality Date     SECTION      x3    COLONOSCOPY N/A 2023    Procedure: COLONOSCOPY;  Surgeon: Rafaela Portillo MD;  Location: [facility];  Service: Endoscopy;  Laterality: N/A;    HYSTERECTOMY  2015    supracervical       family history includes Heart disease in her mother; Hypertension in her father and mother.    Social History     Tobacco Use    Smoking status: Never    Smokeless tobacco: Never   Substance Use Topics    Alcohol use: Yes     Alcohol/week: 3.0 standard drinks of alcohol     Types: 3 Glasses of wine per week     Comment: daily    Drug use: No       Review of Systems   Constitutional:  Negative for chills and fever.   Respiratory:  Negative for cough and shortness of breath.    Cardiovascular:  Negative for chest pain and palpitations.   Gastrointestinal:  Negative for constipation, diarrhea, nausea and vomiting.   Genitourinary:  Negative for dysuria and hematuria.   Musculoskeletal:  Negative for falls.   Neurological:  Negative for headaches.        Outpatient Encounter Medications as of 2024   Medication Sig Dispense Refill    busPIRone (BUSPAR) 10 MG tablet Take 1 tablet (10 mg total) by mouth 3 (three) times daily. 90 tablet 3    cetirizine (ZYRTEC) 10 MG tablet Take 10 mg by mouth as needed for Allergies. (Patient not taking: Reported on 2024)      EScitalopram oxalate (LEXAPRO) 20 MG tablet Take 1 tablet (20 mg total) by mouth once daily. 90 tablet 3    [DISCONTINUED] atovaquone-proguaniL (MALARONE) 250-100 mg Tab Take one tablet daily for malaria prevention. Begin one day before entering malarious area and continue for 1 week after return. (Patient not taking: Reported on 2024)  "20 tablet 0    [DISCONTINUED] azithromycin (ZITHROMAX) 500 MG tablet Take one tablet once a day for 3 days.  Use only as needed in the event of severe diarrhea with associated fevers or blood in stool (Patient not taking: Reported on 11/26/2024) 3 tablet 0    [DISCONTINUED] busPIRone (BUSPAR) 10 MG tablet Take 1 tablet (10 mg total) by mouth 3 (three) times daily. (Patient not taking: Reported on 11/26/2024) 90 tablet 3    [DISCONTINUED] EScitalopram oxalate (LEXAPRO) 20 MG tablet Take 1 tablet (20 mg total) by mouth once daily. (Patient not taking: Reported on 11/26/2024) 90 tablet 3    [DISCONTINUED] ibuprofen (ADVIL,MOTRIN) 200 MG tablet Take 400 mg by mouth as needed for Pain. (Patient not taking: Reported on 11/26/2024)      [DISCONTINUED] metroNIDAZOLE (METROGEL) 0.75 % gel Apply thin layer to entire face once daily. (Patient not taking: Reported on 11/26/2024) 60 g 6    [DISCONTINUED] minocycline 4 % Foam Apply thin layer to entire face once daily. (Patient not taking: Reported on 11/26/2024) 30 g 6    [DISCONTINUED] sulfacetamide sodium-sulfur 10-5 % (w/w) Clsr Wash face qday - bid (Patient not taking: Reported on 11/26/2024) 170 g 6     Facility-Administered Encounter Medications as of 11/26/2024   Medication Dose Route Frequency Provider Last Rate Last Admin    influenza (Flulaval, Fluzone, Fluarix) 45 mcg/0.5 mL IM vaccine (> or = 6 mo) 0.5 mL  0.5 mL Intramuscular 1 time in Clinic/HOD             Review of patient's allergies indicates:  No Known Allergies    Physical Exam      Vital Signs  Pulse: 74  SpO2: 98 %  BP: (!) 136/92  Pain Score: 0-No pain  Height and Weight  Height: 5' 1" (154.9 cm)  Weight: 75.4 kg (166 lb 3.6 oz)  BSA (Calculated - sq m): 1.8 sq meters  BMI (Calculated): 31.4  Weight in (lb) to have BMI = 25: 132]    Physical Exam  Constitutional:       Appearance: She is well-developed.   HENT:      Head: Normocephalic and atraumatic.   Cardiovascular:      Rate and Rhythm: Normal rate and " "regular rhythm.      Heart sounds: Normal heart sounds. No murmur heard.  Pulmonary:      Effort: Pulmonary effort is normal. No respiratory distress.      Breath sounds: Normal breath sounds.   Abdominal:      General: There is no distension.      Palpations: Abdomen is soft.      Tenderness: There is no abdominal tenderness. There is no guarding.   Skin:     General: Skin is warm and dry.   Neurological:      Mental Status: She is alert. Mental status is at baseline.   Psychiatric:         Behavior: Behavior normal.          Laboratory:  CBC:  No results for input(s): "WBC", "RBC", "HGB", "HCT", "PLT", "MCV", "MCH", "MCHC" in the last 2160 hours.  CMP:  No results for input(s): "GLU", "CALCIUM", "ALBUMIN", "PROT", "NA", "K", "CO2", "CL", "BUN", "ALKPHOS", "ALT", "AST", "BILITOT" in the last 2160 hours.    Invalid input(s): "CREATININ"  URINALYSIS:  No results for input(s): "COLORU", "CLARITYU", "SPECGRAV", "PHUR", "PROTEINUA", "GLUCOSEU", "BILIRUBINCON", "BLOODU", "WBCU", "RBCU", "BACTERIA", "MUCUS", "NITRITE", "LEUKOCYTESUR", "UROBILINOGEN", "HYALINECASTS" in the last 2160 hours.   LIPIDS:  No results for input(s): "TSH", "HDL", "CHOL", "TRIG", "LDLCALC", "CHOLHDL", "NONHDLCHOL", "TOTALCHOLEST" in the last 2160 hours.  TSH:  No results for input(s): "TSH" in the last 2160 hours.  A1C:  No results for input(s): "HGBA1C" in the last 2160 hours.    Radiology:  No results found in the last 30 days.     Assessment/Plan     Maureen Gordillo is a 49 y.o.female with:    1. Anxiety and depression  - EScitalopram oxalate (LEXAPRO) 20 MG tablet; Take 1 tablet (20 mg total) by mouth once daily.  Dispense: 90 tablet; Refill: 3  - TSH; Future    2. Mixed hyperlipidemia    3. Annual physical exam  - CBC Auto Differential; Future  - Lipid Panel; Future  - Comprehensive Metabolic Panel; Future    4. Need for influenza vaccination  - influenza (Flulaval, Fluzone, Fluarix) 45 mcg/0.5 mL IM vaccine (> or = 6 mo) 0.5 mL    5. Class 1 " obesity due to excess calories without serious comorbidity with body mass index (BMI) of 31.0 to 31.9 in adult  - Hemoglobin A1C; Future      -flu shot today  -restart lexapro and buspar  -Continue current medications and maintain follow up with specialists.    -Follow up in about 1 year (around 11/26/2025) for Annual Visit.       Luna Arechiga MD  [facility]

## 2025-01-15 ENCOUNTER — PATIENT MESSAGE (OUTPATIENT)
Dept: PRIMARY CARE CLINIC | Facility: CLINIC | Age: 50
End: 2025-01-15
Payer: COMMERCIAL

## 2025-01-16 RX ORDER — BUSPIRONE HYDROCHLORIDE 10 MG/1
10 TABLET ORAL 3 TIMES DAILY
Qty: 90 TABLET | Refills: 3 | Status: SHIPPED | OUTPATIENT
Start: 2025-01-16 | End: 2026-01-16

## 2025-01-16 NOTE — TELEPHONE ENCOUNTER
Patient requesting refill and increase on Buspar 10 mg  Pt's  LOV with Luna Arechiga MD , 11/26/2024  Medication pending

## 2025-01-16 NOTE — TELEPHONE ENCOUNTER
No care due was identified.  [facility] Embedded Care Due Messages. Reference number: [id number].   1/16/2025 9:29:25 AM CST

## 2025-02-04 ENCOUNTER — PATIENT MESSAGE (OUTPATIENT)
Dept: PRIMARY CARE CLINIC | Facility: CLINIC | Age: 50
End: 2025-02-04
Payer: COMMERCIAL